# Patient Record
Sex: MALE | Race: BLACK OR AFRICAN AMERICAN | NOT HISPANIC OR LATINO | URBAN - METROPOLITAN AREA
[De-identification: names, ages, dates, MRNs, and addresses within clinical notes are randomized per-mention and may not be internally consistent; named-entity substitution may affect disease eponyms.]

---

## 2022-03-16 ENCOUNTER — INPATIENT (INPATIENT)
Facility: HOSPITAL | Age: 42
LOS: 8 days | Discharge: NURSING, REHAB OR LONG-TERM CARE | End: 2022-03-25
Attending: INTERNAL MEDICINE | Admitting: INTERNAL MEDICINE
Payer: MEDICAID

## 2022-03-16 VITALS
OXYGEN SATURATION: 100 % | HEART RATE: 107 BPM | RESPIRATION RATE: 18 BRPM | SYSTOLIC BLOOD PRESSURE: 70 MMHG | DIASTOLIC BLOOD PRESSURE: 25 MMHG

## 2022-03-16 LAB
ALBUMIN SERPL ELPH-MCNC: 4 G/DL — SIGNIFICANT CHANGE UP (ref 3.3–5)
ALP SERPL-CCNC: 150 U/L — HIGH (ref 40–120)
ALT FLD-CCNC: 142 U/L — HIGH (ref 12–78)
ANION GAP SERPL CALC-SCNC: 43 MMOL/L — HIGH (ref 5–17)
APTT BLD: 39.2 SEC — HIGH (ref 27.5–35.5)
AST SERPL-CCNC: 665 U/L — HIGH (ref 15–37)
BASE EXCESS BLDA CALC-SCNC: -22.9 MMOL/L — LOW (ref -2–3)
BASOPHILS # BLD AUTO: 0.02 K/UL — SIGNIFICANT CHANGE UP (ref 0–0.2)
BASOPHILS NFR BLD AUTO: 0.2 % — SIGNIFICANT CHANGE UP (ref 0–2)
BILIRUB SERPL-MCNC: 4.8 MG/DL — HIGH (ref 0.2–1.2)
BLOOD GAS COMMENTS: SIGNIFICANT CHANGE UP
BLOOD GAS COMMENTS: SIGNIFICANT CHANGE UP
BLOOD GAS SOURCE: SIGNIFICANT CHANGE UP
BUN SERPL-MCNC: 33 MG/DL — HIGH (ref 7–23)
CALCIUM SERPL-MCNC: 9.9 MG/DL — SIGNIFICANT CHANGE UP (ref 8.5–10.1)
CHLORIDE SERPL-SCNC: 87 MMOL/L — LOW (ref 96–108)
CK SERPL-CCNC: 146 U/L — SIGNIFICANT CHANGE UP (ref 26–308)
CO2 BLDA-SCNC: 7 MMOL/L — LOW (ref 19–24)
CO2 SERPL-SCNC: 8 MMOL/L — CRITICAL LOW (ref 22–31)
CREAT SERPL-MCNC: 3.69 MG/DL — HIGH (ref 0.5–1.3)
EGFR: 20 ML/MIN/1.73M2 — LOW
EOSINOPHIL # BLD AUTO: 0.01 K/UL — SIGNIFICANT CHANGE UP (ref 0–0.5)
EOSINOPHIL NFR BLD AUTO: 0.1 % — SIGNIFICANT CHANGE UP (ref 0–6)
FLUAV AG NPH QL: SIGNIFICANT CHANGE UP
FLUAV AG NPH QL: SIGNIFICANT CHANGE UP
FLUBV AG NPH QL: SIGNIFICANT CHANGE UP
FLUBV AG NPH QL: SIGNIFICANT CHANGE UP
GAS PNL BLDA: SIGNIFICANT CHANGE UP
GLUCOSE BLDC GLUCOMTR-MCNC: 163 MG/DL — HIGH (ref 70–99)
GLUCOSE BLDC GLUCOMTR-MCNC: 248 MG/DL — HIGH (ref 70–99)
GLUCOSE SERPL-MCNC: 215 MG/DL — HIGH (ref 70–99)
HCO3 BLDA-SCNC: 7 MMOL/L — CRITICAL LOW (ref 21–28)
HCT VFR BLD CALC: 43.9 % — SIGNIFICANT CHANGE UP (ref 39–50)
HGB BLD-MCNC: 14.1 G/DL — SIGNIFICANT CHANGE UP (ref 13–17)
HOROWITZ INDEX BLDA+IHG-RTO: 40 — SIGNIFICANT CHANGE UP
IMM GRANULOCYTES NFR BLD AUTO: 0.8 % — SIGNIFICANT CHANGE UP (ref 0–1.5)
INR BLD: 1.3 RATIO — HIGH (ref 0.88–1.16)
LACTATE SERPL-SCNC: 18.9 MMOL/L — CRITICAL HIGH (ref 0.7–2)
LYMPHOCYTES # BLD AUTO: 1.54 K/UL — SIGNIFICANT CHANGE UP (ref 1–3.3)
LYMPHOCYTES # BLD AUTO: 13.5 % — SIGNIFICANT CHANGE UP (ref 13–44)
MANUAL SMEAR VERIFICATION: YES — SIGNIFICANT CHANGE UP
MCHC RBC-ENTMCNC: 32.1 G/DL — SIGNIFICANT CHANGE UP (ref 32–36)
MCHC RBC-ENTMCNC: 36 PG — HIGH (ref 27–34)
MCV RBC AUTO: 112 FL — HIGH (ref 80–100)
MONOCYTES # BLD AUTO: 0.66 K/UL — SIGNIFICANT CHANGE UP (ref 0–0.9)
MONOCYTES NFR BLD AUTO: 5.8 % — SIGNIFICANT CHANGE UP (ref 2–14)
NEUTROPHILS # BLD AUTO: 9.08 K/UL — HIGH (ref 1.8–7.4)
NEUTROPHILS NFR BLD AUTO: 79.6 % — HIGH (ref 43–77)
NRBC # BLD: 1 /100 WBCS — HIGH (ref 0–0)
PCO2 BLDA: 25 MMHG — LOW (ref 32–46)
PH BLD: 7.03 — CRITICAL LOW (ref 7.35–7.45)
PLAT MORPH BLD: NORMAL — SIGNIFICANT CHANGE UP
PLATELET # BLD AUTO: 114 K/UL — LOW (ref 150–400)
PO2 BLDA: 208 MMHG — HIGH (ref 83–108)
POTASSIUM SERPL-MCNC: 3.4 MMOL/L — LOW (ref 3.5–5.3)
POTASSIUM SERPL-SCNC: 3.4 MMOL/L — LOW (ref 3.5–5.3)
PROT SERPL-MCNC: 8 GM/DL — SIGNIFICANT CHANGE UP (ref 6–8.3)
PROTHROM AB SERPL-ACNC: 15.5 SEC — HIGH (ref 10.5–13.4)
RBC # BLD: 3.92 M/UL — LOW (ref 4.2–5.8)
RBC # FLD: 14.1 % — SIGNIFICANT CHANGE UP (ref 10.3–14.5)
RBC BLD AUTO: SIGNIFICANT CHANGE UP
SAO2 % BLDA: 100 % — HIGH (ref 94–98)
SARS-COV-2 RNA SPEC QL NAA+PROBE: SIGNIFICANT CHANGE UP
SARS-COV-2 RNA SPEC QL NAA+PROBE: SIGNIFICANT CHANGE UP
SODIUM SERPL-SCNC: 138 MMOL/L — SIGNIFICANT CHANGE UP (ref 135–145)
TROPONIN I, HIGH SENSITIVITY RESULT: 28.3 NG/L — SIGNIFICANT CHANGE UP
TSH SERPL-MCNC: 6.94 UIU/ML — HIGH (ref 0.36–3.74)
WBC # BLD: 11.4 K/UL — HIGH (ref 3.8–10.5)
WBC # FLD AUTO: 11.4 K/UL — HIGH (ref 3.8–10.5)

## 2022-03-16 PROCEDURE — 99291 CRITICAL CARE FIRST HOUR: CPT

## 2022-03-16 PROCEDURE — 71045 X-RAY EXAM CHEST 1 VIEW: CPT | Mod: 26

## 2022-03-16 PROCEDURE — 70450 CT HEAD/BRAIN W/O DYE: CPT | Mod: 26,MA

## 2022-03-16 PROCEDURE — 93010 ELECTROCARDIOGRAM REPORT: CPT

## 2022-03-16 RX ORDER — FENTANYL CITRATE 50 UG/ML
3 INJECTION INTRAVENOUS
Qty: 2500 | Refills: 0 | Status: DISCONTINUED | OUTPATIENT
Start: 2022-03-16 | End: 2022-03-19

## 2022-03-16 RX ORDER — SODIUM BICARBONATE 1 MEQ/ML
150 SYRINGE (ML) INTRAVENOUS ONCE
Refills: 0 | Status: COMPLETED | OUTPATIENT
Start: 2022-03-16 | End: 2022-03-16

## 2022-03-16 RX ORDER — SODIUM CHLORIDE 9 MG/ML
1000 INJECTION, SOLUTION INTRAVENOUS
Refills: 0 | Status: DISCONTINUED | OUTPATIENT
Start: 2022-03-16 | End: 2022-03-17

## 2022-03-16 RX ORDER — ENOXAPARIN SODIUM 100 MG/ML
40 INJECTION SUBCUTANEOUS EVERY 24 HOURS
Refills: 0 | Status: DISCONTINUED | OUTPATIENT
Start: 2022-03-16 | End: 2022-03-16

## 2022-03-16 RX ORDER — SODIUM BICARBONATE 1 MEQ/ML
0.28 SYRINGE (ML) INTRAVENOUS
Qty: 150 | Refills: 0 | Status: DISCONTINUED | OUTPATIENT
Start: 2022-03-16 | End: 2022-03-16

## 2022-03-16 RX ORDER — VANCOMYCIN HCL 1 G
1000 VIAL (EA) INTRAVENOUS ONCE
Refills: 0 | Status: COMPLETED | OUTPATIENT
Start: 2022-03-16 | End: 2022-03-16

## 2022-03-16 RX ORDER — HEPARIN SODIUM 5000 [USP'U]/ML
5000 INJECTION INTRAVENOUS; SUBCUTANEOUS EVERY 12 HOURS
Refills: 0 | Status: DISCONTINUED | OUTPATIENT
Start: 2022-03-16 | End: 2022-03-18

## 2022-03-16 RX ORDER — SODIUM CHLORIDE 9 MG/ML
1000 INJECTION INTRAMUSCULAR; INTRAVENOUS; SUBCUTANEOUS ONCE
Refills: 0 | Status: COMPLETED | OUTPATIENT
Start: 2022-03-16 | End: 2022-03-16

## 2022-03-16 RX ORDER — PIPERACILLIN AND TAZOBACTAM 4; .5 G/20ML; G/20ML
3.38 INJECTION, POWDER, LYOPHILIZED, FOR SOLUTION INTRAVENOUS EVERY 8 HOURS
Refills: 0 | Status: COMPLETED | OUTPATIENT
Start: 2022-03-16 | End: 2022-03-22

## 2022-03-16 RX ORDER — CHLORHEXIDINE GLUCONATE 213 G/1000ML
1 SOLUTION TOPICAL
Refills: 0 | Status: DISCONTINUED | OUTPATIENT
Start: 2022-03-16 | End: 2022-03-17

## 2022-03-16 RX ORDER — PANTOPRAZOLE SODIUM 20 MG/1
40 TABLET, DELAYED RELEASE ORAL DAILY
Refills: 0 | Status: DISCONTINUED | OUTPATIENT
Start: 2022-03-16 | End: 2022-03-20

## 2022-03-16 RX ORDER — CHLORHEXIDINE GLUCONATE 213 G/1000ML
15 SOLUTION TOPICAL EVERY 12 HOURS
Refills: 0 | Status: DISCONTINUED | OUTPATIENT
Start: 2022-03-16 | End: 2022-03-19

## 2022-03-16 RX ORDER — SODIUM BICARBONATE 1 MEQ/ML
0.21 SYRINGE (ML) INTRAVENOUS
Qty: 150 | Refills: 0 | Status: DISCONTINUED | OUTPATIENT
Start: 2022-03-16 | End: 2022-03-16

## 2022-03-16 RX ORDER — NOREPINEPHRINE BITARTRATE/D5W 8 MG/250ML
0.05 PLASTIC BAG, INJECTION (ML) INTRAVENOUS
Qty: 8 | Refills: 0 | Status: DISCONTINUED | OUTPATIENT
Start: 2022-03-16 | End: 2022-03-19

## 2022-03-16 RX ADMIN — PIPERACILLIN AND TAZOBACTAM 200 GRAM(S): 4; .5 INJECTION, POWDER, LYOPHILIZED, FOR SOLUTION INTRAVENOUS at 22:53

## 2022-03-16 RX ADMIN — Medication 6.56 MICROGRAM(S)/KG/MIN: at 21:22

## 2022-03-16 RX ADMIN — Medication 150 MILLIEQUIVALENT(S): at 22:47

## 2022-03-16 RX ADMIN — FENTANYL CITRATE 21 MICROGRAM(S)/KG/HR: 50 INJECTION INTRAVENOUS at 21:22

## 2022-03-16 RX ADMIN — SODIUM CHLORIDE 1000 MILLILITER(S): 9 INJECTION INTRAMUSCULAR; INTRAVENOUS; SUBCUTANEOUS at 21:31

## 2022-03-16 NOTE — ED PROVIDER NOTE - PHYSICAL EXAMINATION
Constitutional: Unresponsive,  intubated.   ENMT: Airway patent.  Eyes: Pupils 5mm equal minimal reactive  Cardiac: Normal rate, regular rhythm.  Heart sounds S1, S2.  Respiratory: Breath sounds clear and equal bilaterally.  Gastrointestinal: Abdomen soft, non-tender, no guarding.  Neurological: Unresponsive, intubated.  Skin: No evidence of rash.

## 2022-03-16 NOTE — PATIENT PROFILE ADULT - NSPROMEDSADMININFO_GEN_A_NUR
Patient sedated and vented. Unable to get answer Patient sedated and vented. Unable to get answer/no concerns

## 2022-03-16 NOTE — H&P ADULT - HISTORY OF PRESENT ILLNESS
* History obtained from chart review and ED physician as pt is currently intubated and no family available at this time.     41 year old man with a PMHx of seizure (on keppra). Family called EMS after finding pt unresponsive in his bed. On arrival EMS reports pt was spontaneously breathing and had a pulse but was only responsive to pain. Finger stick was 23. Per EMS, pt went into witnessed arrest shortly there after,  ACLS immediately initiated, ROSC achieved after about 5-10 mins. No shocks given. On arrival to ED pt in sinus tach, sugars in 200s, hypotensive to 70s systolic and levophed drip was started. Patient started biting on the ETT and fentanyl drip was ordered.        * History obtained from chart review and ED physician as pt is currently intubated and no family available at this time.     41 year old man with a PMHx of seizure (on keppra). Family called EMS after finding pt unresponsive in his bed. On arrival EMS reports pt was spontaneously breathing and had a pulse but was only responsive to pain. Finger stick was 23. Per EMS, pt went into witnessed arrest shortly there after, ACLS immediately initiated, ROSC achieved after about 5-10 mins. No shocks given. On arrival to ED pt in sinus tachy, glucose in 200s, hypotensive to 70s systolic. Levophed drip was started. Patient started biting on the ETT and fentanyl drip was ordered. Labs showed K: 3.4, Cl: 87, gap: 43, BUN: 33, Creat: 3.6, T Bili: 4.8, AST/ALT elevated, L: 18.9, TSH: 6.9, pH: 7.0, pCo2: 25, HCO3: 7. CT Head done reported no acute intracranial hemorrhage or mass effect and no evidence of diffuse global anoxic injury on head CT.    Patient admitted to the ICU for management of acute hypoxic respiratory failure, post cardiac arrest, encephalopathy, JHONATAN, transaminitis, hypoglycemia, lactic acidosis, and severe metabolic acidosis.

## 2022-03-16 NOTE — ED ADULT TRIAGE NOTE - CHIEF COMPLAINT QUOTE
As per EMS As per EMS wife came home and found pt in bed supine position speech incomprehensible and responsive to pain. Finger stick checked and noted to be 23. States ALS crew was called and [facility]. EMS states 1 amp D50 and 1 epi given with ROSC. Repeat finger stick 176, ET tube 8.0.

## 2022-03-16 NOTE — PATIENT PROFILE ADULT - OVER THE PAST TWO WEEKS, HAVE YOU FELT LITTLE INTEREST OR PLEASURE IN DOING THINGS?
Patient sedated and vented. Unable to get answer Patient sedated and vented. Unable to get answer/no

## 2022-03-16 NOTE — ED ADULT NURSE NOTE - CHIEF COMPLAINT QUOTE
As per EMS wife came home and found pt in bed supine position speech incomprehensible and responsive to pain. Finger stick checked and noted to be 23. States ALS crew was called and while being transferred to [facility] pt went into cardiac arrest. EMS states 1 amp D50 and 1 epi given with ROSC. Repeat finger stick 176, ET tube 8.0.

## 2022-03-16 NOTE — H&P ADULT - NSHPPHYSICALEXAM_GEN_ALL_CORE
CONSTITUTIONAL: intubated and sedated   EYES: pupils sluggish to light reaction b/l 2 mm  ENMT: Oral mucosa with moist membranes. No external nasal lesions noted. ETT in place. OG tube in place   NECK: Supple, symmetric and without tracheal deviation  RESPIRATORY: CTA b/l, no wheezes, rales or rhonchi heard.   CARDIOVASCULAR: RRR, +S1, S2, no murmurs, no rubs, no gallops; no JVD; no peripheral edema  Vascular: pulses palpable   GASTROINTESTINAL: Soft, non tender, non distended, no rebound. Bowel sounds present   GENITOURINARY: Normal appearing external genitalia, no penile lesion. Alves in place.   MUSCULOSKELETAL: on physical stimuli, noted to move slightly the lower extremities.   SKIN: No rashes or ulcers noted.   NEUROLOGIC: RASS -3 ( on sedation ), corneals weak, positive gag and cough.   PSYCHIATRIC: unable to assess at this time. CONSTITUTIONAL: intubated and sedated   EYES: pupils sluggish to light reaction b/l 2 mm. Icteric sclera   ENMT: Oral mucosa with moist membranes. No external nasal lesions noted. ETT in place. OG tube in place   NECK: Supple, symmetric and without tracheal deviation  RESPIRATORY: CTA b/l, no wheezes, rales or rhonchi heard.   CARDIOVASCULAR: RRR, +S1, S2, no murmurs, no rubs, no gallops; no JVD; no peripheral edema  Vascular: pulses palpable   GASTROINTESTINAL: Soft, non tender, non distended, no rebound. Bowel sounds present   GENITOURINARY: Normal appearing external genitalia, no penile lesion. Alves in place.   MUSCULOSKELETAL: on physical stimuli, noted to move slightly the lower extremities.   SKIN: No rashes or ulcers noted.   NEUROLOGIC: RASS -3 ( on sedation ), corneals weak, positive gag and cough.   PSYCHIATRIC: unable to assess at this time.

## 2022-03-16 NOTE — PATIENT PROFILE ADULT - NSPROGENBLOODRESTRICT_GEN_A_NUR
Patient sedated and vented. Unable to get answer Patient sedated and vented. Unable to get answer/none

## 2022-03-16 NOTE — ED PROVIDER NOTE - OBJECTIVE STATEMENT
41M per EMS pmhx of seizure on keppra, family called 911 after finding pt unresponsive in his bed. On arrival EMS reports pt was spontaneously breathing and had a pulse but was only responsive to pain. Pt was promptly loaded into the ambulance where a finger stick showed 23. Pt went into witnessed arrest shortly there after, per EMS ACLS immediately initiated, 5-10 min of coding total, pt received 1 epi and an amp of d50 followed by ROSC.  No shocks given. Pt made stable blood pressure (130s/80s) following ROSC and was transported to the ED in sinus tach. On arrival to ED pt in sinus tach, sugars in 200s, hypotensive to 70s systolic, levophed started, etco2 ~30. ICU made aware.

## 2022-03-16 NOTE — PATIENT PROFILE ADULT - PATIENT'S GENDER IDENTITY
Patient sedated and vented. Unable to get answer Patient sedated and vented. Unable to get answer/Male

## 2022-03-16 NOTE — PATIENT PROFILE ADULT - FALL HARM RISK - UNIVERSAL INTERVENTIONS
Bed in lowest position, wheels locked, appropriate side rails in place/Call bell, personal items and telephone in reach/Instruct patient to call for assistance before getting out of bed or chair/Non-slip footwear when patient is out of bed/[place] to call system/Physically safe environment - no spills, clutter or unnecessary equipment/Purposeful Proactive Rounding/Room/bathroom lighting operational, light cord in reach

## 2022-03-16 NOTE — ED ADULT TRIAGE NOTE - NS ED TRIAGE AVPU SCALE
Unresponsive - The patient is nonverbal and does not respond even when a painful stimulus is applied. impaired balance/decreased flexibility/pain/decreased ROM/decreased strength

## 2022-03-16 NOTE — ED PROVIDER NOTE - CLINICAL SUMMARY MEDICAL DECISION MAKING FREE TEXT BOX
young male presenting post arrest intubated by EMS  requiring low dose levophed gtt for BP support  Pupils 5mm minimally reactive though pt biting on tube; fentanyl ordered  Will scan head, obtain labs/xray, q30 fingersticks, likely ICU admit.

## 2022-03-16 NOTE — ED ADULT NURSE NOTE - OBJECTIVE STATEMENT
As per EMS wife came home and found pt in bed supine position speech incomprehensible and responsive to pain. Finger stick checked and noted to be 23. States ALS crew was [facility]. EMS states 1 amp D50 and 1 epi given with ROSC in the field.     pt initiating own breath, intubated, being bagged by ems, respiratory paged to bedside/ pt hypotensive on arrival brought he bed 4 for resus. Repeat finger stick 176, ET tube 8.0.

## 2022-03-16 NOTE — H&P ADULT - NSHPLABSRESULTS_GEN_ALL_CORE
Lab Results:    CBC Full  -  ( 16 Mar 2022 21:21 )  WBC Count : 11.40 K/uL  RBC Count : 3.92 M/uL  Hemoglobin : 14.1 g/dL  Hematocrit : 43.9 %  Platelet Count - Automated : 114 K/uL  Mean Cell Volume : 112.0 fl  Mean Cell Hemoglobin : 36.0 pg  Mean Cell Hemoglobin Concentration : 32.1 g/dL  Auto Neutrophil # : 9.08 K/uL  Auto Lymphocyte # : 1.54 K/uL  Auto Monocyte # : 0.66 K/uL  Auto Eosinophil # : 0.01 K/uL  Auto Basophil # : 0.02 K/uL  Auto Neutrophil % : 79.6 %  Auto Lymphocyte % : 13.5 %  Auto Monocyte % : 5.8 %  Auto Eosinophil % : 0.1 %  Auto Basophil % : 0.2 %    Chemistry                        14.1   11.40 )-----------( 114      ( 16 Mar 2022 21:21 )             43.9     03-16    138  |  87<L>  |  33<H>  ----------------------------<  215<H>  3.4<L>   |  8<LL>  |  3.69<H>    Ca    9.9      16 Mar 2022 21:21    TPro  8.0  /  Alb  4.0  /  TBili  4.8<H>  /  DBili  x   /  AST  665<H>  /  ALT  142<H>  /  AlkPhos  150<H>  03-16    LIVER FUNCTIONS - ( 16 Mar 2022 21:21 )  Alb: 4.0 g/dL / Pro: 8.0 gm/dL / ALK PHOS: 150 U/L / ALT: 142 U/L / AST: 665 U/L / GGT: x           PT/INR - ( 16 Mar 2022 21:21 )   PT: 15.5 sec;   INR: 1.30 ratio    PTT - ( 16 Mar 2022 21:21 )  PTT:39.2 sec      ABG - ( 16 Mar 2022 21:35 )  pH, Arterial: x     pH, Blood: 7.03  /  pCO2: 25    /  pO2: 208   / HCO3: 7     / Base Excess: -22.9 /  SaO2: 100.0       Lactate, Blood: 18.9 mmol/L (03-16-22 @ 21:21)          RADIOLOGY RESULTS:    Head CT: No acute intracranial hemorrhage or mass effect. No evidence of diffuse global anoxic injury on head CT. Right temporal craniotomy with resection cavity and encephalomalacia/gliosis in the right anterior temporal lobe. Lab Results:    CBC Full  -  ( 16 Mar 2022 21:21 )  WBC Count : 11.40 K/uL  RBC Count : 3.92 M/uL  Hemoglobin : 14.1 g/dL  Hematocrit : 43.9 %  Platelet Count - Automated : 114 K/uL  Mean Cell Volume : 112.0 fl  Mean Cell Hemoglobin : 36.0 pg  Mean Cell Hemoglobin Concentration : 32.1 g/dL  Auto Neutrophil # : 9.08 K/uL  Auto Lymphocyte # : 1.54 K/uL  Auto Monocyte # : 0.66 K/uL  Auto Eosinophil # : 0.01 K/uL  Auto Basophil # : 0.02 K/uL  Auto Neutrophil % : 79.6 %  Auto Lymphocyte % : 13.5 %  Auto Monocyte % : 5.8 %  Auto Eosinophil % : 0.1 %  Auto Basophil % : 0.2 %    Chemistry                        14.1   11.40 )-----------( 114      ( 16 Mar 2022 21:21 )             43.9     03-16    138  |  87<L>  |  33<H>  ----------------------------<  215<H>  3.4<L>   |  8<LL>  |  3.69<H>    Ca    9.9      16 Mar 2022 21:21    TPro  8.0  /  Alb  4.0  /  TBili  4.8<H>  /  DBili  x   /  AST  665<H>  /  ALT  142<H>  /  AlkPhos  150<H>  03-16    LIVER FUNCTIONS - ( 16 Mar 2022 21:21 )  Alb: 4.0 g/dL / Pro: 8.0 gm/dL / ALK PHOS: 150 U/L / ALT: 142 U/L / AST: 665 U/L / GGT: x           PT/INR - ( 16 Mar 2022 21:21 )   PT: 15.5 sec;   INR: 1.30 ratio    PTT - ( 16 Mar 2022 21:21 )  PTT:39.2 sec    ABG - ( 16 Mar 2022 21:35 )  pH, Arterial: x     pH, Blood: 7.03  /  pCO2: 25    /  pO2: 208   / HCO3: 7     / Base Excess: -22.9 /  SaO2: 100.0     Lactate, Blood: 18.9 mmol/L (03-16-22 @ 21:21)        RADIOLOGY RESULTS:    Head CT: No acute intracranial hemorrhage or mass effect. No evidence of diffuse global anoxic injury on head CT. Right temporal craniotomy with resection cavity and encephalomalacia/gliosis in the right anterior temporal lobe.

## 2022-03-16 NOTE — PATIENT PROFILE ADULT - PATIENT'S SEXUAL ORIENTATION
Patient sedated and vented. Unable to get answer Patient sedated and vented. Unable to get answer/Heterosexual

## 2022-03-16 NOTE — PATIENT PROFILE ADULT - FUNCTIONAL SCREEN CURRENT LEVEL: COMMUNICATION, MLM
3 = unable to understand or speak (not related to language barrier) Patient sedated and vented. Unable to get answer/3 = unable to understand or speak (not related to language barrier)

## 2022-03-16 NOTE — H&P ADULT - ATTENDING COMMENTS
40 y/o M w/history of seizure disorder, ETOH abuse, and prior craniotomy? presenting following cardiac arrest. Unclear etiology of cardiac arrest. Acute respiratory failure with hypoxia and hypercapnia in setting of arrest. Hypotension. JHONATAN likely ATN. Elevaeted liver enzymes possibly alcoholic hepatitis vs shock liver. Severe lactic acidosis - possibly due to arrest/ischemia vs seizure.    - TTM goal 36 degrees  - Sedation as needed  - Continue mechanical ventilation  - Titrate pressors as needed goal MAP >= 65  - Broad spectrum abx  - Trend Cr, avoid nephrotoxins  - Trend LFTs, avoid hepatotoxins  - TTE  - DVT prophylaxis  - Full code  - Guarded prognosis

## 2022-03-16 NOTE — PATIENT PROFILE ADULT - VISION (WITH CORRECTIVE LENSES IF THE PATIENT USUALLY WEARS THEM):
Patient sedated and vented. Unable to get answer Patient sedated and vented. Unable to get answer/Normal vision: sees adequately in most situations; can see medication labels, newsprint

## 2022-03-16 NOTE — H&P ADULT - ASSESSMENT
Plan:     Neuro:  CT head negative for anything acute.   Neuro checks q 4 hour   On fentanyl drip for sedation.   Titrate sedation to RASS of 0 to -2  History of seizures per notes. ? if pt had a seizure at home.   Keppra level ordered.   Neuro consult in am.       CV:   Continue hemodynamic monitoring   Currently on levophed drip.   Target MAP 65-70.   Troponin neg x 1  Echo ordered.   EKG ordered.   Trend lactate level    Pulmonary:   Continue with mechanical ventilation.  Settings in the ED: 16/450/40%/+5  Settings adjusted.   CXR ordered  Abg ordered   No weaning at this time.     GI:   GI prophylaxis with pantoprazole   NPO  insert ngt    Endo:   Monitor fingersticks q 4 hrs   Hgb A1c and TSH ordered      Renal:   Alves to be placed in a critical ill pt for accurate urinary monitoring   I and O hourly   Renally dose medications  Monitor and correct electrolytes  Sodium bicarb drip started for metabolic acidosis      ID:   Blood, urine and sputum cultures sent   UA ordered.   Started emperically on zosyn and vanco by level.   Trend lactate level.   Procal ordered.   Covid Negative.     Heme:   Repeat labs   DVT prophylaxis with SCD and lovenox       Other/Disposition:  Patient will be admitted to the ICU.   Code Status: Full code  Will try to reach family for more information and to update them    Case discussed with ICU attending.      * History obtained from chart review and ED physician as pt is currently intubated and no family available at this time.     41 year old man with a PMHx of seizure (on keppra). Family called EMS after finding pt unresponsive in his bed. On arrival EMS reports pt was spontaneously breathing and had a pulse but was only responsive to pain. Finger stick was 23. Per EMS, pt went into witnessed arrest shortly there after, ACLS immediately initiated, ROSC achieved after about 5-10 mins. No shocks given. On arrival to ED pt in sinus tachy, glucose in 200s, hypotensive to 70s systolic. Levophed drip was started. Patient started biting on the ETT and fentanyl drip was ordered. Labs showed K: 3.4, Cl: 87, gap: 43, BUN: 33, Creat: 3.6, T Bili: 4.8, AST/ALT elevated, L: 18.9, TSH: 6.9, pH: 7.0, pCo2: 25, HCO3: 7. CT Head done reported no acute intracranial hemorrhage or mass effect and no evidence of diffuse global anoxic injury on head CT.    Patient admitted to the ICU for management of acute hypoxic respiratory failure, post cardiac arrest, encephalopathy, JHONATAN, transaminitis, hypoglycemia, lactic acidosis, and severe metabolic acidosis.         Plan:     Neuro:  CT head negative for anything acute.   Neuro checks q 4 hour   On fentanyl drip for sedation.   Titrate sedation to RASS of 0 to -2  History of seizures per notes. ? if pt had a seizure at home.   Keppra level ordered.   Neuro consult in am.   Targeted temperature management   Avoid fever.    CV:   Continue hemodynamic monitoring   Currently on levophed drip.   Target MAP of 65-70.   Troponin neg x 1  Echo ordered.   EKG ordered.   Trend lactate level    Pulmonary:   Continue with mechanical ventilation.  Settings in the ED: 16/450/40%/+5  Settings adjusted.   CXR ordered  Abg ordered   No weaning at this time.     GI:   GI prophylaxis with pantoprazole   NPO  insert ngt    Endo:   Monitor fingersticks q 4 hrs   Hgb A1c and TSH ordered    Renal:   Alves to be placed in a critical ill pt for accurate urinary monitoring   I and O hourly   Renally dose medications  Monitor and correct electrolytes  Sodium bicarb ordered.     ID:   Blood, urine and sputum cultures sent   UA ordered.   Started empirically on zosyn and vanco by level.   Trend lactate level.   Procal ordered.   Covid Negative.     Heme:   Repeat labs   DVT prophylaxis with SCD and lovenox       Other/Disposition:  Patient will be admitted to the ICU.   Code Status: Full code  Will try to reach family for more information and to update them    Case discussed with ICU attending.      * History obtained from chart review and ED physician as pt is currently intubated and no family available at this time.     41 year old man with a PMHx of seizure (on keppra). Family called EMS after finding pt unresponsive in his bed. On arrival EMS reports pt was spontaneously breathing and had a pulse but was only responsive to pain. Finger stick was 23. Per EMS, pt went into witnessed arrest shortly there after, ACLS immediately initiated, ROSC achieved after about 5-10 mins. No shocks given. On arrival to ED pt in sinus tachy, glucose in 200s, hypotensive to 70s systolic. Levophed drip was started. Patient started biting on the ETT and fentanyl drip was ordered. Labs showed K: 3.4, Cl: 87, gap: 43, BUN: 33, Creat: 3.6, T Bili: 4.8, AST/ALT elevated, L: 18.9, TSH: 6.9, pH: 7.0, pCo2: 25, HCO3: 7. CT Head done reported no acute intracranial hemorrhage or mass effect and no evidence of diffuse global anoxic injury on head CT.    Patient admitted to the ICU for management of acute hypoxic respiratory failure, post cardiac arrest, encephalopathy, JHONATAN, transaminitis, hypoglycemia, lactic acidosis, and severe metabolic acidosis.         Plan:     Neuro:  CT head negative for anything acute.   Neuro checks q 4 hour   On fentanyl drip for sedation.   Titrate sedation to RASS of 0 to -2  History of seizures per notes. ? if pt had a seizure at home.   Keppra level ordered.   Neuro consult in am.   Targeted temperature management   Avoid fever.    CV:   Continue hemodynamic monitoring   Currently on levophed drip.   Target MAP of 65-70.   Troponin neg x 1  Echo ordered.   EKG ordered.   Trend lactate level    Pulmonary:   Continue with mechanical ventilation.  Settings in the ED: 16/450/40%/+5  Settings adjusted.   CXR ordered  Abg ordered   No weaning at this time.     GI:   GI prophylaxis with pantoprazole   NPO  insert ngt  US abdomen ordered     Endo:   Monitor fingersticks q 4 hrs   Hgb A1c and TSH ordered    Renal:   Alves to be placed in a critical ill pt for accurate urinary monitoring   I and O hourly   Renally dose medications  Monitor and correct electrolytes  Sodium bicarb ordered.     ID:   Blood, urine and sputum cultures sent   UA ordered.   Started empirically on zosyn and vanco by level.   Trend lactate level.   Procal ordered.   Covid Negative.     Heme:   Repeat labs   DVT prophylaxis with SCD and lovenox       Other/Disposition:  Patient will be admitted to the ICU.   Code Status: Full code  Will try to reach family for more information and to update them    Case discussed with ICU attending.

## 2022-03-16 NOTE — CHART NOTE - NSCHARTNOTEFT_GEN_A_CORE
Bedside POCUS done. Bilateral A-line predominant pattern on lung imaging. Cardiac echo showed decreased LV contractility with normal RV contractility and normal chamber sizes.

## 2022-03-17 LAB
ALBUMIN SERPL ELPH-MCNC: 2.9 G/DL — LOW (ref 3.3–5)
ALBUMIN SERPL ELPH-MCNC: 3.4 G/DL — SIGNIFICANT CHANGE UP (ref 3.3–5)
ALP SERPL-CCNC: 124 U/L — HIGH (ref 40–120)
ALP SERPL-CCNC: 92 U/L — SIGNIFICANT CHANGE UP (ref 40–120)
ALT FLD-CCNC: 141 U/L — HIGH (ref 12–78)
ALT FLD-CCNC: 156 U/L — HIGH (ref 12–78)
AMMONIA BLD-MCNC: 91 UMOL/L — HIGH (ref 11–32)
AMPHET UR-MCNC: NEGATIVE — SIGNIFICANT CHANGE UP
ANION GAP SERPL CALC-SCNC: 22 MMOL/L — HIGH (ref 5–17)
ANION GAP SERPL CALC-SCNC: 28 MMOL/L — HIGH (ref 5–17)
ANION GAP SERPL CALC-SCNC: 36 MMOL/L — HIGH (ref 5–17)
APAP SERPL-MCNC: < 2 UG/ML (ref 10–30)
APPEARANCE UR: ABNORMAL
APTT BLD: 35.7 SEC — HIGH (ref 27.5–35.5)
AST SERPL-CCNC: 1025 U/L — HIGH (ref 15–37)
AST SERPL-CCNC: 968 U/L — HIGH (ref 15–37)
BACTERIA # UR AUTO: ABNORMAL
BARBITURATES UR SCN-MCNC: NEGATIVE — SIGNIFICANT CHANGE UP
BASOPHILS # BLD AUTO: 0.01 K/UL — SIGNIFICANT CHANGE UP (ref 0–0.2)
BASOPHILS NFR BLD AUTO: 0.2 % — SIGNIFICANT CHANGE UP (ref 0–2)
BENZODIAZ UR-MCNC: NEGATIVE — SIGNIFICANT CHANGE UP
BILIRUB DIRECT SERPL-MCNC: 3.3 MG/DL — HIGH (ref 0–0.3)
BILIRUB INDIRECT FLD-MCNC: 1 MG/DL — SIGNIFICANT CHANGE UP (ref 0.2–1)
BILIRUB SERPL-MCNC: 4.3 MG/DL — HIGH (ref 0.2–1.2)
BILIRUB SERPL-MCNC: 5.9 MG/DL — HIGH (ref 0.2–1.2)
BILIRUB UR-MCNC: ABNORMAL
BUN SERPL-MCNC: 33 MG/DL — HIGH (ref 7–23)
BUN SERPL-MCNC: 34 MG/DL — HIGH (ref 7–23)
BUN SERPL-MCNC: 35 MG/DL — HIGH (ref 7–23)
CALCIUM SERPL-MCNC: 8.1 MG/DL — LOW (ref 8.5–10.1)
CALCIUM SERPL-MCNC: 8.3 MG/DL — LOW (ref 8.5–10.1)
CALCIUM SERPL-MCNC: 8.7 MG/DL — SIGNIFICANT CHANGE UP (ref 8.5–10.1)
CHLORIDE SERPL-SCNC: 100 MMOL/L — SIGNIFICANT CHANGE UP (ref 96–108)
CHLORIDE SERPL-SCNC: 95 MMOL/L — LOW (ref 96–108)
CHLORIDE SERPL-SCNC: 98 MMOL/L — SIGNIFICANT CHANGE UP (ref 96–108)
CO2 SERPL-SCNC: 12 MMOL/L — LOW (ref 22–31)
CO2 SERPL-SCNC: 16 MMOL/L — LOW (ref 22–31)
CO2 SERPL-SCNC: 20 MMOL/L — LOW (ref 22–31)
COCAINE METAB.OTHER UR-MCNC: NEGATIVE — SIGNIFICANT CHANGE UP
COLOR SPEC: YELLOW — SIGNIFICANT CHANGE UP
CREAT SERPL-MCNC: 2.65 MG/DL — HIGH (ref 0.5–1.3)
CREAT SERPL-MCNC: 2.68 MG/DL — HIGH (ref 0.5–1.3)
CREAT SERPL-MCNC: 2.95 MG/DL — HIGH (ref 0.5–1.3)
DIFF PNL FLD: ABNORMAL
EGFR: 26 ML/MIN/1.73M2 — LOW
EGFR: 30 ML/MIN/1.73M2 — LOW
EGFR: 30 ML/MIN/1.73M2 — LOW
EOSINOPHIL # BLD AUTO: 0 K/UL — SIGNIFICANT CHANGE UP (ref 0–0.5)
EOSINOPHIL NFR BLD AUTO: 0 % — SIGNIFICANT CHANGE UP (ref 0–6)
EPI CELLS # UR: SIGNIFICANT CHANGE UP
ETHANOL SERPL-MCNC: <10 MG/DL — SIGNIFICANT CHANGE UP (ref 0–10)
FERRITIN SERPL-MCNC: HIGH NG/ML (ref 30–400)
GAS PNL BLDA: SIGNIFICANT CHANGE UP
GLUCOSE BLDC GLUCOMTR-MCNC: 105 MG/DL — HIGH (ref 70–99)
GLUCOSE BLDC GLUCOMTR-MCNC: 131 MG/DL — HIGH (ref 70–99)
GLUCOSE BLDC GLUCOMTR-MCNC: 155 MG/DL — HIGH (ref 70–99)
GLUCOSE BLDC GLUCOMTR-MCNC: 169 MG/DL — HIGH (ref 70–99)
GLUCOSE BLDC GLUCOMTR-MCNC: 80 MG/DL — SIGNIFICANT CHANGE UP (ref 70–99)
GLUCOSE BLDC GLUCOMTR-MCNC: 98 MG/DL — SIGNIFICANT CHANGE UP (ref 70–99)
GLUCOSE SERPL-MCNC: 127 MG/DL — HIGH (ref 70–99)
GLUCOSE SERPL-MCNC: 152 MG/DL — HIGH (ref 70–99)
GLUCOSE SERPL-MCNC: 154 MG/DL — HIGH (ref 70–99)
GLUCOSE UR QL: NEGATIVE MG/DL — SIGNIFICANT CHANGE UP
GRAM STN FLD: SIGNIFICANT CHANGE UP
HAV IGM SER-ACNC: SIGNIFICANT CHANGE UP
HBV CORE IGM SER-ACNC: SIGNIFICANT CHANGE UP
HBV SURFACE AG SER-ACNC: SIGNIFICANT CHANGE UP
HCT VFR BLD CALC: 31.1 % — LOW (ref 39–50)
HCT VFR BLD CALC: 37.4 % — LOW (ref 39–50)
HCV AB S/CO SERPL IA: 0.21 S/CO — SIGNIFICANT CHANGE UP (ref 0–0.99)
HCV AB SERPL-IMP: SIGNIFICANT CHANGE UP
HGB BLD-MCNC: 10.7 G/DL — LOW (ref 13–17)
HGB BLD-MCNC: 12 G/DL — LOW (ref 13–17)
IMM GRANULOCYTES NFR BLD AUTO: 0.9 % — SIGNIFICANT CHANGE UP (ref 0–1.5)
INR BLD: 1.4 RATIO — HIGH (ref 0.88–1.16)
IRON SATN MFR SERPL: 139 UG/DL — SIGNIFICANT CHANGE UP (ref 45–165)
KETONES UR-MCNC: ABNORMAL
LACTATE SERPL-SCNC: 11 MMOL/L — CRITICAL HIGH (ref 0.7–2)
LACTATE SERPL-SCNC: 12.4 MMOL/L — CRITICAL HIGH (ref 0.7–2)
LACTATE SERPL-SCNC: 16.6 MMOL/L — CRITICAL HIGH (ref 0.7–2)
LACTATE SERPL-SCNC: 17.5 MMOL/L — CRITICAL HIGH (ref 0.7–2)
LEGIONELLA AG UR QL: NEGATIVE — SIGNIFICANT CHANGE UP
LEUKOCYTE ESTERASE UR-ACNC: ABNORMAL
LYMPHOCYTES # BLD AUTO: 0.24 K/UL — LOW (ref 1–3.3)
LYMPHOCYTES # BLD AUTO: 5.5 % — LOW (ref 13–44)
MAGNESIUM SERPL-MCNC: 1.5 MG/DL — LOW (ref 1.6–2.6)
MAGNESIUM SERPL-MCNC: 1.9 MG/DL — SIGNIFICANT CHANGE UP (ref 1.6–2.6)
MAGNESIUM SERPL-MCNC: 2 MG/DL — SIGNIFICANT CHANGE UP (ref 1.6–2.6)
MCHC RBC-ENTMCNC: 32.1 G/DL — SIGNIFICANT CHANGE UP (ref 32–36)
MCHC RBC-ENTMCNC: 34.4 G/DL — SIGNIFICANT CHANGE UP (ref 32–36)
MCHC RBC-ENTMCNC: 35.8 PG — HIGH (ref 27–34)
MCHC RBC-ENTMCNC: 36 PG — HIGH (ref 27–34)
MCV RBC AUTO: 104.7 FL — HIGH (ref 80–100)
MCV RBC AUTO: 111.6 FL — HIGH (ref 80–100)
METHADONE UR-MCNC: NEGATIVE — SIGNIFICANT CHANGE UP
MONOCYTES # BLD AUTO: 0.13 K/UL — SIGNIFICANT CHANGE UP (ref 0–0.9)
MONOCYTES NFR BLD AUTO: 3 % — SIGNIFICANT CHANGE UP (ref 2–14)
NEUTROPHILS # BLD AUTO: 3.91 K/UL — SIGNIFICANT CHANGE UP (ref 1.8–7.4)
NEUTROPHILS NFR BLD AUTO: 90.4 % — HIGH (ref 43–77)
NITRITE UR-MCNC: POSITIVE
NRBC # BLD: 2 /100 WBCS — HIGH (ref 0–0)
NRBC # BLD: 3 /100 WBCS — HIGH (ref 0–0)
OPIATES UR-MCNC: NEGATIVE — SIGNIFICANT CHANGE UP
PCP SPEC-MCNC: SIGNIFICANT CHANGE UP
PCP UR-MCNC: NEGATIVE — SIGNIFICANT CHANGE UP
PH UR: 5 — SIGNIFICANT CHANGE UP (ref 5–8)
PHOSPHATE SERPL-MCNC: 0.9 MG/DL — CRITICAL LOW (ref 2.5–4.5)
PHOSPHATE SERPL-MCNC: 1.5 MG/DL — LOW (ref 2.5–4.5)
PHOSPHATE SERPL-MCNC: 7.1 MG/DL — HIGH (ref 2.5–4.5)
PLATELET # BLD AUTO: 78 K/UL — LOW (ref 150–400)
PLATELET # BLD AUTO: 88 K/UL — LOW (ref 150–400)
POTASSIUM SERPL-MCNC: 2.6 MMOL/L — CRITICAL LOW (ref 3.5–5.3)
POTASSIUM SERPL-MCNC: 3.1 MMOL/L — LOW (ref 3.5–5.3)
POTASSIUM SERPL-MCNC: 3.5 MMOL/L — SIGNIFICANT CHANGE UP (ref 3.5–5.3)
POTASSIUM SERPL-SCNC: 2.6 MMOL/L — CRITICAL LOW (ref 3.5–5.3)
POTASSIUM SERPL-SCNC: 3.1 MMOL/L — LOW (ref 3.5–5.3)
POTASSIUM SERPL-SCNC: 3.5 MMOL/L — SIGNIFICANT CHANGE UP (ref 3.5–5.3)
PROCALCITONIN SERPL-MCNC: 7.09 NG/ML — HIGH (ref 0.02–0.1)
PROT SERPL-MCNC: 5.4 GM/DL — LOW (ref 6–8.3)
PROT SERPL-MCNC: 6.4 GM/DL — SIGNIFICANT CHANGE UP (ref 6–8.3)
PROT UR-MCNC: 100 MG/DL
PROTHROM AB SERPL-ACNC: 16.7 SEC — HIGH (ref 10.5–13.4)
RBC # BLD: 2.97 M/UL — LOW (ref 4.2–5.8)
RBC # BLD: 3.35 M/UL — LOW (ref 4.2–5.8)
RBC # FLD: 13.7 % — SIGNIFICANT CHANGE UP (ref 10.3–14.5)
RBC # FLD: 14.2 % — SIGNIFICANT CHANGE UP (ref 10.3–14.5)
RBC CASTS # UR COMP ASSIST: ABNORMAL /HPF (ref 0–4)
SODIUM SERPL-SCNC: 142 MMOL/L — SIGNIFICANT CHANGE UP (ref 135–145)
SODIUM SERPL-SCNC: 142 MMOL/L — SIGNIFICANT CHANGE UP (ref 135–145)
SODIUM SERPL-SCNC: 143 MMOL/L — SIGNIFICANT CHANGE UP (ref 135–145)
SP GR SPEC: 1.02 — SIGNIFICANT CHANGE UP (ref 1.01–1.02)
SPECIMEN SOURCE: SIGNIFICANT CHANGE UP
T3FREE SERPL-MCNC: 1.7 PG/ML — LOW (ref 1.8–4.6)
T4 AB SER-ACNC: 5.5 UG/DL — SIGNIFICANT CHANGE UP (ref 4.6–12)
THC UR QL: NEGATIVE — SIGNIFICANT CHANGE UP
TROPONIN I, HIGH SENSITIVITY RESULT: 94.8 NG/L — HIGH
UROBILINOGEN FLD QL: 8 MG/DL
WBC # BLD: 4.33 K/UL — SIGNIFICANT CHANGE UP (ref 3.8–10.5)
WBC # BLD: 6 K/UL — SIGNIFICANT CHANGE UP (ref 3.8–10.5)
WBC # FLD AUTO: 4.33 K/UL — SIGNIFICANT CHANGE UP (ref 3.8–10.5)
WBC # FLD AUTO: 6 K/UL — SIGNIFICANT CHANGE UP (ref 3.8–10.5)
WBC UR QL: SIGNIFICANT CHANGE UP

## 2022-03-17 PROCEDURE — 93306 TTE W/DOPPLER COMPLETE: CPT | Mod: 26

## 2022-03-17 PROCEDURE — 99291 CRITICAL CARE FIRST HOUR: CPT

## 2022-03-17 PROCEDURE — 76700 US EXAM ABDOM COMPLETE: CPT | Mod: 26

## 2022-03-17 PROCEDURE — 93010 ELECTROCARDIOGRAM REPORT: CPT

## 2022-03-17 RX ORDER — THIAMINE MONONITRATE (VIT B1) 100 MG
100 TABLET ORAL DAILY
Refills: 0 | Status: DISCONTINUED | OUTPATIENT
Start: 2022-03-17 | End: 2022-03-20

## 2022-03-17 RX ORDER — SODIUM CHLORIDE 9 MG/ML
500 INJECTION, SOLUTION INTRAVENOUS ONCE
Refills: 0 | Status: DISCONTINUED | OUTPATIENT
Start: 2022-03-17 | End: 2022-03-17

## 2022-03-17 RX ORDER — SODIUM CHLORIDE 9 MG/ML
1000 INJECTION, SOLUTION INTRAVENOUS ONCE
Refills: 0 | Status: COMPLETED | OUTPATIENT
Start: 2022-03-17 | End: 2022-03-17

## 2022-03-17 RX ORDER — POTASSIUM PHOSPHATE, MONOBASIC POTASSIUM PHOSPHATE, DIBASIC 236; 224 MG/ML; MG/ML
30 INJECTION, SOLUTION INTRAVENOUS ONCE
Refills: 0 | Status: COMPLETED | OUTPATIENT
Start: 2022-03-17 | End: 2022-03-17

## 2022-03-17 RX ORDER — POTASSIUM CHLORIDE 20 MEQ
10 PACKET (EA) ORAL
Refills: 0 | Status: COMPLETED | OUTPATIENT
Start: 2022-03-17 | End: 2022-03-17

## 2022-03-17 RX ORDER — ASCORBIC ACID 60 MG
500 TABLET,CHEWABLE ORAL DAILY
Refills: 0 | Status: DISCONTINUED | OUTPATIENT
Start: 2022-03-17 | End: 2022-03-25

## 2022-03-17 RX ORDER — DEXMEDETOMIDINE HYDROCHLORIDE IN 0.9% SODIUM CHLORIDE 4 UG/ML
0.7 INJECTION INTRAVENOUS
Qty: 200 | Refills: 0 | Status: DISCONTINUED | OUTPATIENT
Start: 2022-03-17 | End: 2022-03-19

## 2022-03-17 RX ORDER — MAGNESIUM SULFATE 500 MG/ML
2 VIAL (ML) INJECTION ONCE
Refills: 0 | Status: COMPLETED | OUTPATIENT
Start: 2022-03-17 | End: 2022-03-17

## 2022-03-17 RX ORDER — FOLIC ACID 0.8 MG
1 TABLET ORAL DAILY
Refills: 0 | Status: DISCONTINUED | OUTPATIENT
Start: 2022-03-17 | End: 2022-03-20

## 2022-03-17 RX ORDER — LACTULOSE 10 G/15ML
10 SOLUTION ORAL EVERY 12 HOURS
Refills: 0 | Status: DISCONTINUED | OUTPATIENT
Start: 2022-03-17 | End: 2022-03-21

## 2022-03-17 RX ORDER — CHLORHEXIDINE GLUCONATE 213 G/1000ML
1 SOLUTION TOPICAL DAILY
Refills: 0 | Status: DISCONTINUED | OUTPATIENT
Start: 2022-03-17 | End: 2022-03-22

## 2022-03-17 RX ORDER — LEVETIRACETAM 250 MG/1
750 TABLET, FILM COATED ORAL EVERY 12 HOURS
Refills: 0 | Status: DISCONTINUED | OUTPATIENT
Start: 2022-03-17 | End: 2022-03-21

## 2022-03-17 RX ORDER — POTASSIUM CHLORIDE 20 MEQ
40 PACKET (EA) ORAL ONCE
Refills: 0 | Status: COMPLETED | OUTPATIENT
Start: 2022-03-17 | End: 2022-03-17

## 2022-03-17 RX ORDER — LEVOTHYROXINE SODIUM 125 MCG
25 TABLET ORAL DAILY
Refills: 0 | Status: DISCONTINUED | OUTPATIENT
Start: 2022-03-17 | End: 2022-03-25

## 2022-03-17 RX ORDER — ALBUMIN HUMAN 25 %
250 VIAL (ML) INTRAVENOUS EVERY 6 HOURS
Refills: 0 | Status: DISCONTINUED | OUTPATIENT
Start: 2022-03-17 | End: 2022-03-18

## 2022-03-17 RX ORDER — SODIUM CHLORIDE 9 MG/ML
1000 INJECTION, SOLUTION INTRAVENOUS
Refills: 0 | Status: DISCONTINUED | OUTPATIENT
Start: 2022-03-17 | End: 2022-03-19

## 2022-03-17 RX ADMIN — Medication 25 MICROGRAM(S): at 22:12

## 2022-03-17 RX ADMIN — DEXMEDETOMIDINE HYDROCHLORIDE IN 0.9% SODIUM CHLORIDE 9.56 MICROGRAM(S)/KG/HR: 4 INJECTION INTRAVENOUS at 17:26

## 2022-03-17 RX ADMIN — SODIUM CHLORIDE 1000 MILLILITER(S): 9 INJECTION INTRAMUSCULAR; INTRAVENOUS; SUBCUTANEOUS at 00:30

## 2022-03-17 RX ADMIN — Medication 100 MILLIEQUIVALENT(S): at 08:10

## 2022-03-17 RX ADMIN — CHLORHEXIDINE GLUCONATE 1 APPLICATION(S): 213 SOLUTION TOPICAL at 13:55

## 2022-03-17 RX ADMIN — POTASSIUM PHOSPHATE, MONOBASIC POTASSIUM PHOSPHATE, DIBASIC 83.33 MILLIMOLE(S): 236; 224 INJECTION, SOLUTION INTRAVENOUS at 07:46

## 2022-03-17 RX ADMIN — SODIUM CHLORIDE 100 MILLILITER(S): 9 INJECTION, SOLUTION INTRAVENOUS at 05:47

## 2022-03-17 RX ADMIN — LEVETIRACETAM 400 MILLIGRAM(S): 250 TABLET, FILM COATED ORAL at 22:09

## 2022-03-17 RX ADMIN — FENTANYL CITRATE 21 MICROGRAM(S)/KG/HR: 50 INJECTION INTRAVENOUS at 23:46

## 2022-03-17 RX ADMIN — Medication 40 MILLIEQUIVALENT(S): at 22:08

## 2022-03-17 RX ADMIN — Medication 100 MILLIEQUIVALENT(S): at 09:20

## 2022-03-17 RX ADMIN — HEPARIN SODIUM 5000 UNIT(S): 5000 INJECTION INTRAVENOUS; SUBCUTANEOUS at 17:25

## 2022-03-17 RX ADMIN — Medication 125 MILLILITER(S): at 18:16

## 2022-03-17 RX ADMIN — Medication 6.56 MICROGRAM(S)/KG/MIN: at 23:49

## 2022-03-17 RX ADMIN — SODIUM CHLORIDE 1000 MILLILITER(S): 9 INJECTION, SOLUTION INTRAVENOUS at 00:00

## 2022-03-17 RX ADMIN — Medication 1 MILLIGRAM(S): at 22:08

## 2022-03-17 RX ADMIN — HEPARIN SODIUM 5000 UNIT(S): 5000 INJECTION INTRAVENOUS; SUBCUTANEOUS at 05:47

## 2022-03-17 RX ADMIN — DEXMEDETOMIDINE HYDROCHLORIDE IN 0.9% SODIUM CHLORIDE 9.56 MICROGRAM(S)/KG/HR: 4 INJECTION INTRAVENOUS at 23:46

## 2022-03-17 RX ADMIN — Medication 500 MILLIGRAM(S): at 22:19

## 2022-03-17 RX ADMIN — LACTULOSE 10 GRAM(S): 10 SOLUTION ORAL at 22:11

## 2022-03-17 RX ADMIN — PANTOPRAZOLE SODIUM 40 MILLIGRAM(S): 20 TABLET, DELAYED RELEASE ORAL at 11:42

## 2022-03-17 RX ADMIN — Medication 100 MILLIEQUIVALENT(S): at 06:58

## 2022-03-17 RX ADMIN — Medication 25 GRAM(S): at 06:51

## 2022-03-17 RX ADMIN — Medication 1 APPLICATION(S): at 22:12

## 2022-03-17 RX ADMIN — Medication 125 MILLILITER(S): at 11:43

## 2022-03-17 RX ADMIN — LEVETIRACETAM 400 MILLIGRAM(S): 250 TABLET, FILM COATED ORAL at 09:52

## 2022-03-17 RX ADMIN — DEXMEDETOMIDINE HYDROCHLORIDE IN 0.9% SODIUM CHLORIDE 9.56 MICROGRAM(S)/KG/HR: 4 INJECTION INTRAVENOUS at 14:37

## 2022-03-17 RX ADMIN — Medication 6.56 MICROGRAM(S)/KG/MIN: at 05:46

## 2022-03-17 RX ADMIN — PIPERACILLIN AND TAZOBACTAM 200 GRAM(S): 4; .5 INJECTION, POWDER, LYOPHILIZED, FOR SOLUTION INTRAVENOUS at 22:09

## 2022-03-17 RX ADMIN — CHLORHEXIDINE GLUCONATE 1 APPLICATION(S): 213 SOLUTION TOPICAL at 06:30

## 2022-03-17 RX ADMIN — SODIUM CHLORIDE 100 MILLILITER(S): 9 INJECTION, SOLUTION INTRAVENOUS at 23:45

## 2022-03-17 RX ADMIN — SODIUM CHLORIDE 1000 MILLILITER(S): 9 INJECTION, SOLUTION INTRAVENOUS at 02:00

## 2022-03-17 RX ADMIN — Medication 100 MILLIGRAM(S): at 22:19

## 2022-03-17 RX ADMIN — SODIUM CHLORIDE 1000 MILLILITER(S): 9 INJECTION, SOLUTION INTRAVENOUS at 06:46

## 2022-03-17 RX ADMIN — Medication 1 APPLICATION(S): at 13:54

## 2022-03-17 RX ADMIN — FENTANYL CITRATE 21 MICROGRAM(S)/KG/HR: 50 INJECTION INTRAVENOUS at 05:46

## 2022-03-17 RX ADMIN — PIPERACILLIN AND TAZOBACTAM 200 GRAM(S): 4; .5 INJECTION, POWDER, LYOPHILIZED, FOR SOLUTION INTRAVENOUS at 05:47

## 2022-03-17 RX ADMIN — Medication 250 MILLIGRAM(S): at 00:07

## 2022-03-17 RX ADMIN — CHLORHEXIDINE GLUCONATE 15 MILLILITER(S): 213 SOLUTION TOPICAL at 17:24

## 2022-03-17 RX ADMIN — CHLORHEXIDINE GLUCONATE 15 MILLILITER(S): 213 SOLUTION TOPICAL at 05:47

## 2022-03-17 RX ADMIN — PIPERACILLIN AND TAZOBACTAM 200 GRAM(S): 4; .5 INJECTION, POWDER, LYOPHILIZED, FOR SOLUTION INTRAVENOUS at 13:53

## 2022-03-17 NOTE — PROGRESS NOTE ADULT - ASSESSMENT
40 yo M and family reports hx craniotomy (family at bedside unsure why he had craniotomy), sz d/o, DM, pancreatitis and etoh abuse admitted to ICU w/ cardiac arrest ROSC 10min. Apparently hypoglycemic which may have triggered episode. Unclear if had sz prior to event that may have triggered. Also concerning that pt w/ jaundice and severe liver dysfxn (given hx likely from etoh abuse) that may have played component in hypoglycemia    Neuro  sedation vacation daily as tolerated to assess mental status  needs some sedation as has vent dyssynchrony  maintain normothermia  check eeg  cont keppra  check ammonia level      Pulm  cont vent support  maintain sat>90% by titrating fio2/peep    CVS  wean down levophed as low dose  maintain map >65  echo pending  ekg w/o ischemia  LA severely high given liver/renal dysfxn    GI  f/u US  concern for etoh cirrhosis  can check w/ Gi regarding other etiologies for liver dysfxn  check tsat for hemochromatosis    Endo  keep FS <200 but given hypoglycemia that may have caused cardiac arrest will avoid too tight control    Renal   JHONATAN fro atn from cardiac arrest  monitor uo and lytes    Patient admitted to the ICU for management of acute hypoxic respiratory failure, post cardiac arrest, encephalopathy, JHONATAN, transaminitis, hypoglycemia, lactic acidosis, and severe metabolic acidosis.      (16 Mar 2022 22:33) 40 yo M and family reports hx craniotomy (family at bedside unsure why he had craniotomy), sz d/o, DM, pancreatitis and etoh abuse admitted to ICU w/ cardiac arrest ROSC 10min. Apparently hypoglycemic which may have triggered episode. Unclear if had sz prior to event that may have triggered. Also concerning that pt w/ jaundice and severe liver dysfxn (given hx likely from etoh abuse) that may have played component in hypoglycemia    Neuro  sedation vacation daily as tolerated to assess mental status  needs some sedation as has vent dyssynchrony  maintain normothermia  check eeg  cont keppra  check ammonia level      Pulm  cont vent support  maintain sat>90% by titrating fio2/peep    CVS  wean down levophed as low dose  maintain map >65  echo pending  ekg w/o ischemia  LA severely high given liver/renal dysfxn    GI  f/u US  concern for etoh cirrhosis  can check w/ Gi regarding other etiologies for liver dysfxn  check tsat for hemochromatosis    Endo  keep FS <200 but given hypoglycemia that may have caused cardiac arrest will avoid too tight control    Renal   JHONATAN fro atn from cardiac arrest  monitor uo and lytes    ID  empiric zosyn, f/u cx    Prognosis guarded given cardiac arrest and mosf

## 2022-03-17 NOTE — PROGRESS NOTE ADULT - SUBJECTIVE AND OBJECTIVE BOX
HPI:    * History obtained from chart review and ED physician as pt is currently intubated and no family available at this time.     41 year old man with a PMHx of seizure (on keppra). Family called EMS after finding pt unresponsive in his bed. On arrival EMS reports pt was spontaneously breathing and had a pulse but was only responsive to pain. Finger stick was 23. Per EMS, pt went into witnessed arrest shortly there after, ACLS immediately initiated, ROSC achieved after about 5-10 mins. No shocks given. On arrival to ED pt in sinus tachy, glucose in 200s, hypotensive to 70s systolic. Levophed drip was started. Patient started biting on the ETT and fentanyl drip was ordered.   CT Head done reported no acute intracranial hemorrhage or mass effect and no evidence of diffuse global anoxic injury on head CT.    Patient admitted to the ICU for management of acute hypoxic respiratory failure, post cardiac arrest, encephalopathy, JHONATAN, transaminitis, hypoglycemia, lactic acidosis, and severe metabolic acidosis.      (16 Mar 2022 22:33)      24 hr events:    no arrhythmias noted      ## Labs:  CBC:                        10.7   6.00  )-----------( 78       ( 17 Mar 2022 05:45 )             31.1     Chem:  03-17    142  |  100  |  35<H>  ----------------------------<  127<H>  3.1<L>   |  20<L>  |  2.65<H>    Ca    8.1<L>      17 Mar 2022 10:29  Phos  1.5     03-17  Mg     1.9     03-17    TPro  5.4<L>  /  Alb  2.9<L>  /  TBili  4.3<H>  /  DBili  3.3<H>  /  AST  1025<H>  /  ALT  141<H>  /  AlkPhos  92  03-17    Coags:  PT/INR - ( 17 Mar 2022 00:39 )   PT: 16.7 sec;   INR: 1.40 ratio         PTT - ( 17 Mar 2022 00:39 )  PTT:35.7 sec             ## Medications:  piperacillin/tazobactam IVPB... 3.375 Gram(s) IV Intermittent every 8 hours    norepinephrine Infusion 0.05 MICROgram(s)/kG/Min IV Continuous <Continuous>        heparin   Injectable 5000 Unit(s) SubCutaneous every 12 hours    pantoprazole  Injectable 40 milliGRAM(s) IV Push daily    dexMEDEtomidine Infusion 0.7 MICROgram(s)/kG/Hr IV Continuous <Continuous>  fentaNYL   Infusion. 3 MICROgram(s)/kG/Hr IV Continuous <Continuous>  levETIRAcetam  IVPB 750 milliGRAM(s) IV Intermittent every 12 hours      ## Vitals:  T(C): 37.8 (03-17-22 @ 07:00), Max: 37.8 (03-17-22 @ 07:00)  HR: 106 (03-17-22 @ 14:30) (72 - 140)  BP: 124/69 (03-17-22 @ 14:30) (70/25 - 154/86)  BP(mean): 80 (03-17-22 @ 14:30) (54 - 103)  RR: 20 (03-17-22 @ 14:30) (15 - 32)  SpO2: 99% (03-17-22 @ 14:30) (98% - 100%)  Wt(kg): --  Vent: Mode: AC/ CMV (Assist Control/ Continuous Mandatory Ventilation), RR (machine): 20, RR (patient): 24, TV (machine): 450, FiO2: 25, PEEP: 5, PIP: 19  ABG: ABG - ( 17 Mar 2022 05:43 )  pH, Arterial: 7.44  pH, Blood: x     /  pCO2: 24    /  pO2: 149   / HCO3: 16    / Base Excess: -6.4  /  SaO2: 99.6                  03-16 @ 07:01  -  03-17 @ 07:00  --------------------------------------------------------  IN: 2716.2 mL / OUT: 165 mL / NET: 2551.2 mL    03-17 @ 07:01  -  03-17 @ 15:07  --------------------------------------------------------  IN: 52.6 mL / OUT: 100 mL / NET: -47.4 mL          ## P/E:  Gen: intubated, NAD  HEENT: pupils reactive sluggish   Lungs: b/l cta  Heart: s1s2 reg no murmur  Abd: +BS soft nontender, enlarged liver  Ext: no edema  Neuro: opens eyes, not tracking, not following commands but withdraws ext to stimuli      < from: CT Head No Cont (03.16.22 @ 21:55) >    ACC: [id number] EXAM:  CT BRAIN                          PROCEDURE DATE:  03/16/2022          INTERPRETATION:  NONCONTRAST CT OF THE BRAIN DATED 3/16/2022.    CLINICAL INFORMATION:  Seizures, unresponsive. Cardiac arrest.    TECHNIQUE: Axial CT images are obtained from the cranial vertex to the   skullbase without the administration of IV contrast. Images are   reformatted in sagittal and coronal planes.    No prior studies are available for comparison.    FINDINGS:    There is a right temporal craniotomy with resection cavity and   encephalomalacia/gliosis in the right anterior temporal lobe. There is no   acute intra-axial or extra-axial hemorrhage. There is no significant mass   effect or shift of the midline. The basal cisterns are not effaced. There   is mild cerebral volume loss with prominence of the ventricles and sulci.   Gray-white matter differentiation is grossly preserved. There are mild   atherosclerotic calcifications of the intracranial carotid arteries.    The regional soft tissues are unremarkable. The visualized paranasal   sinuses and tympanic/mastoid cavities are clear.    IMPRESSION:    No acute intracranial hemorrhage or mass effect. No evidence of diffuse   global anoxic injury on head CT.    Right temporal craniotomy with resection cavity and   encephalomalacia/gliosis in the right anterior temporal lobe.    --- End of Report ---            BRETT WOOD DO; Attending Radiologist  This document has been electronically signed. Mar 16 2022 10:12PM    < end of copied text >      < from: Xray Chest 1 View- PORTABLE-Urgent (Xray Chest 1 View- PORTABLE-Urgent .) (03.16.22 @ 21:47) >    ACC: [id number] EXAM:  XR CHEST PORTABLE URGENT 1V                          PROCEDURE DATE:  03/16/2022          INTERPRETATION:  TIME OF EXAM: March 16, 2022 at 9:30 PM.    CLINICAL INFORMATION: ET tube check.    COMPARISON:  None available    TECHNIQUE:   AP Portable chest x-ray. Lordotic and rotated. External   cardiac pacer pads project over and obscures portions of the image.    INTERPRETATION:    The heart is not enlarged. ET tube tip is approximately 4.5 cm above the   addie.  Enteric tube extends into the left hemiabdomen. Tip not included on image.  The lungs are clear.  No pleural effusion or pneumothorax is seen.  No acute bony abnormality is noted.      IMPRESSION:  ET tube tip approximately 4.5 cm above the addie.    Enteric tube extends into left hemiabdomen. Tip not included on image.    Clear lungs.    --- End of Report ---            RICHELLE RM MD; Attending Radiologist  This document has been electronically signed. Mar 17 2022 11:02AM    < end of copied text >

## 2022-03-18 DIAGNOSIS — E11.9 TYPE 2 DIABETES MELLITUS WITHOUT COMPLICATIONS: ICD-10-CM

## 2022-03-18 DIAGNOSIS — E03.9 HYPOTHYROIDISM, UNSPECIFIED: ICD-10-CM

## 2022-03-18 LAB
A1C WITH ESTIMATED AVERAGE GLUCOSE RESULT: 5 % — SIGNIFICANT CHANGE UP (ref 4–5.6)
ALBUMIN SERPL ELPH-MCNC: 2.7 G/DL — LOW (ref 3.3–5)
ALBUMIN SERPL ELPH-MCNC: 3 G/DL — LOW (ref 3.3–5)
ALP SERPL-CCNC: 61 U/L — SIGNIFICANT CHANGE UP (ref 40–120)
ALP SERPL-CCNC: 82 U/L — SIGNIFICANT CHANGE UP (ref 40–120)
ALT FLD-CCNC: 114 U/L — HIGH (ref 12–78)
ALT FLD-CCNC: 139 U/L — HIGH (ref 12–78)
AMMONIA BLD-MCNC: 57 UMOL/L — HIGH (ref 11–32)
ANION GAP SERPL CALC-SCNC: 10 MMOL/L — SIGNIFICANT CHANGE UP (ref 5–17)
ANION GAP SERPL CALC-SCNC: 15 MMOL/L — SIGNIFICANT CHANGE UP (ref 5–17)
AST SERPL-CCNC: 655 U/L — HIGH (ref 15–37)
AST SERPL-CCNC: 674 U/L — HIGH (ref 15–37)
BILIRUB SERPL-MCNC: 3.4 MG/DL — HIGH (ref 0.2–1.2)
BILIRUB SERPL-MCNC: 4.5 MG/DL — HIGH (ref 0.2–1.2)
BUN SERPL-MCNC: 37 MG/DL — HIGH (ref 7–23)
BUN SERPL-MCNC: 41 MG/DL — HIGH (ref 7–23)
CALCIUM SERPL-MCNC: 7.6 MG/DL — LOW (ref 8.5–10.1)
CALCIUM SERPL-MCNC: 7.7 MG/DL — LOW (ref 8.5–10.1)
CHLORIDE SERPL-SCNC: 101 MMOL/L — SIGNIFICANT CHANGE UP (ref 96–108)
CHLORIDE SERPL-SCNC: 108 MMOL/L — SIGNIFICANT CHANGE UP (ref 96–108)
CO2 SERPL-SCNC: 25 MMOL/L — SIGNIFICANT CHANGE UP (ref 22–31)
CO2 SERPL-SCNC: 27 MMOL/L — SIGNIFICANT CHANGE UP (ref 22–31)
CREAT SERPL-MCNC: 1.52 MG/DL — HIGH (ref 0.5–1.3)
CREAT SERPL-MCNC: 1.97 MG/DL — HIGH (ref 0.5–1.3)
CULTURE RESULTS: NO GROWTH — SIGNIFICANT CHANGE UP
EGFR: 43 ML/MIN/1.73M2 — LOW
EGFR: 59 ML/MIN/1.73M2 — LOW
ESTIMATED AVERAGE GLUCOSE: 97 MG/DL — SIGNIFICANT CHANGE UP (ref 68–114)
GAS PNL BLDA: SIGNIFICANT CHANGE UP
GAS PNL BLDA: SIGNIFICANT CHANGE UP
GLUCOSE BLDC GLUCOMTR-MCNC: 168 MG/DL — HIGH (ref 70–99)
GLUCOSE BLDC GLUCOMTR-MCNC: 173 MG/DL — HIGH (ref 70–99)
GLUCOSE BLDC GLUCOMTR-MCNC: 183 MG/DL — HIGH (ref 70–99)
GLUCOSE BLDC GLUCOMTR-MCNC: 228 MG/DL — HIGH (ref 70–99)
GLUCOSE BLDC GLUCOMTR-MCNC: 270 MG/DL — HIGH (ref 70–99)
GLUCOSE SERPL-MCNC: 152 MG/DL — HIGH (ref 70–99)
GLUCOSE SERPL-MCNC: 189 MG/DL — HIGH (ref 70–99)
HCT VFR BLD CALC: 25.8 % — LOW (ref 39–50)
HGB BLD-MCNC: 8.9 G/DL — LOW (ref 13–17)
IRON SATN MFR SERPL: 111 UG/DL — SIGNIFICANT CHANGE UP (ref 45–165)
IRON SATN MFR SERPL: 83 % — HIGH (ref 16–55)
LACTATE SERPL-SCNC: 2 MMOL/L — SIGNIFICANT CHANGE UP (ref 0.7–2)
MAGNESIUM SERPL-MCNC: 1.8 MG/DL — SIGNIFICANT CHANGE UP (ref 1.6–2.6)
MAGNESIUM SERPL-MCNC: 1.8 MG/DL — SIGNIFICANT CHANGE UP (ref 1.6–2.6)
MCHC RBC-ENTMCNC: 34.5 G/DL — SIGNIFICANT CHANGE UP (ref 32–36)
MCHC RBC-ENTMCNC: 36.2 PG — HIGH (ref 27–34)
MCV RBC AUTO: 104.9 FL — HIGH (ref 80–100)
NRBC # BLD: 1 /100 WBCS — HIGH (ref 0–0)
PHOSPHATE SERPL-MCNC: 0.7 MG/DL — CRITICAL LOW (ref 2.5–4.5)
PHOSPHATE SERPL-MCNC: 1.4 MG/DL — LOW (ref 2.5–4.5)
PLATELET # BLD AUTO: 52 K/UL — LOW (ref 150–400)
POTASSIUM SERPL-MCNC: 3.3 MMOL/L — LOW (ref 3.5–5.3)
POTASSIUM SERPL-MCNC: 3.9 MMOL/L — SIGNIFICANT CHANGE UP (ref 3.5–5.3)
POTASSIUM SERPL-SCNC: 3.3 MMOL/L — LOW (ref 3.5–5.3)
POTASSIUM SERPL-SCNC: 3.9 MMOL/L — SIGNIFICANT CHANGE UP (ref 3.5–5.3)
PROT SERPL-MCNC: 4.8 GM/DL — LOW (ref 6–8.3)
PROT SERPL-MCNC: 5.2 GM/DL — LOW (ref 6–8.3)
RBC # BLD: 2.46 M/UL — LOW (ref 4.2–5.8)
RBC # FLD: 13.8 % — SIGNIFICANT CHANGE UP (ref 10.3–14.5)
SODIUM SERPL-SCNC: 141 MMOL/L — SIGNIFICANT CHANGE UP (ref 135–145)
SODIUM SERPL-SCNC: 145 MMOL/L — SIGNIFICANT CHANGE UP (ref 135–145)
SPECIMEN SOURCE: SIGNIFICANT CHANGE UP
TIBC SERPL-MCNC: 134 UG/DL — LOW (ref 220–430)
UIBC SERPL-MCNC: 23 UG/DL — LOW (ref 110–370)
VANCOMYCIN FLD-MCNC: 10.9 UG/ML — SIGNIFICANT CHANGE UP
WBC # BLD: 5.43 K/UL — SIGNIFICANT CHANGE UP (ref 3.8–10.5)
WBC # FLD AUTO: 5.43 K/UL — SIGNIFICANT CHANGE UP (ref 3.8–10.5)

## 2022-03-18 PROCEDURE — 95816 EEG AWAKE AND DROWSY: CPT | Mod: 26

## 2022-03-18 PROCEDURE — 99291 CRITICAL CARE FIRST HOUR: CPT

## 2022-03-18 RX ORDER — POTASSIUM PHOSPHATE, MONOBASIC POTASSIUM PHOSPHATE, DIBASIC 236; 224 MG/ML; MG/ML
30 INJECTION, SOLUTION INTRAVENOUS ONCE
Refills: 0 | Status: COMPLETED | OUTPATIENT
Start: 2022-03-18 | End: 2022-03-18

## 2022-03-18 RX ORDER — VANCOMYCIN HCL 1 G
1000 VIAL (EA) INTRAVENOUS ONCE
Refills: 0 | Status: COMPLETED | OUTPATIENT
Start: 2022-03-18 | End: 2022-03-18

## 2022-03-18 RX ORDER — POTASSIUM CHLORIDE 20 MEQ
40 PACKET (EA) ORAL ONCE
Refills: 0 | Status: COMPLETED | OUTPATIENT
Start: 2022-03-18 | End: 2022-03-18

## 2022-03-18 RX ORDER — SODIUM,POTASSIUM PHOSPHATES 278-250MG
1 POWDER IN PACKET (EA) ORAL ONCE
Refills: 0 | Status: COMPLETED | OUTPATIENT
Start: 2022-03-18 | End: 2022-03-18

## 2022-03-18 RX ORDER — MIDODRINE HYDROCHLORIDE 2.5 MG/1
10 TABLET ORAL EVERY 8 HOURS
Refills: 0 | Status: DISCONTINUED | OUTPATIENT
Start: 2022-03-18 | End: 2022-03-20

## 2022-03-18 RX ORDER — MAGNESIUM SULFATE 500 MG/ML
1 VIAL (ML) INJECTION ONCE
Refills: 0 | Status: COMPLETED | OUTPATIENT
Start: 2022-03-18 | End: 2022-03-18

## 2022-03-18 RX ADMIN — DEXMEDETOMIDINE HYDROCHLORIDE IN 0.9% SODIUM CHLORIDE 9.56 MICROGRAM(S)/KG/HR: 4 INJECTION INTRAVENOUS at 07:05

## 2022-03-18 RX ADMIN — DEXMEDETOMIDINE HYDROCHLORIDE IN 0.9% SODIUM CHLORIDE 9.56 MICROGRAM(S)/KG/HR: 4 INJECTION INTRAVENOUS at 11:18

## 2022-03-18 RX ADMIN — LACTULOSE 10 GRAM(S): 10 SOLUTION ORAL at 18:51

## 2022-03-18 RX ADMIN — Medication 125 MILLILITER(S): at 01:00

## 2022-03-18 RX ADMIN — Medication 500 MILLIGRAM(S): at 11:18

## 2022-03-18 RX ADMIN — Medication 40 MILLIEQUIVALENT(S): at 04:35

## 2022-03-18 RX ADMIN — Medication 1 TABLET(S): at 11:32

## 2022-03-18 RX ADMIN — CHLORHEXIDINE GLUCONATE 15 MILLILITER(S): 213 SOLUTION TOPICAL at 05:42

## 2022-03-18 RX ADMIN — SODIUM CHLORIDE 50 MILLILITER(S): 9 INJECTION, SOLUTION INTRAVENOUS at 11:31

## 2022-03-18 RX ADMIN — Medication 1 MILLIGRAM(S): at 12:07

## 2022-03-18 RX ADMIN — DEXMEDETOMIDINE HYDROCHLORIDE IN 0.9% SODIUM CHLORIDE 9.56 MICROGRAM(S)/KG/HR: 4 INJECTION INTRAVENOUS at 02:21

## 2022-03-18 RX ADMIN — FENTANYL CITRATE 21 MICROGRAM(S)/KG/HR: 50 INJECTION INTRAVENOUS at 11:18

## 2022-03-18 RX ADMIN — LACTULOSE 10 GRAM(S): 10 SOLUTION ORAL at 05:42

## 2022-03-18 RX ADMIN — CHLORHEXIDINE GLUCONATE 15 MILLILITER(S): 213 SOLUTION TOPICAL at 18:51

## 2022-03-18 RX ADMIN — Medication 125 MILLILITER(S): at 12:07

## 2022-03-18 RX ADMIN — Medication 25 MICROGRAM(S): at 05:42

## 2022-03-18 RX ADMIN — LEVETIRACETAM 400 MILLIGRAM(S): 250 TABLET, FILM COATED ORAL at 21:56

## 2022-03-18 RX ADMIN — Medication 1 APPLICATION(S): at 21:32

## 2022-03-18 RX ADMIN — Medication 125 MILLILITER(S): at 05:43

## 2022-03-18 RX ADMIN — PIPERACILLIN AND TAZOBACTAM 200 GRAM(S): 4; .5 INJECTION, POWDER, LYOPHILIZED, FOR SOLUTION INTRAVENOUS at 05:42

## 2022-03-18 RX ADMIN — MIDODRINE HYDROCHLORIDE 10 MILLIGRAM(S): 2.5 TABLET ORAL at 14:16

## 2022-03-18 RX ADMIN — CHLORHEXIDINE GLUCONATE 1 APPLICATION(S): 213 SOLUTION TOPICAL at 11:31

## 2022-03-18 RX ADMIN — Medication 1 PACKET(S): at 06:31

## 2022-03-18 RX ADMIN — POTASSIUM PHOSPHATE, MONOBASIC POTASSIUM PHOSPHATE, DIBASIC 83.33 MILLIMOLE(S): 236; 224 INJECTION, SOLUTION INTRAVENOUS at 21:56

## 2022-03-18 RX ADMIN — Medication 100 GRAM(S): at 05:48

## 2022-03-18 RX ADMIN — Medication 100 MILLIGRAM(S): at 11:18

## 2022-03-18 RX ADMIN — Medication 250 MILLIGRAM(S): at 09:45

## 2022-03-18 RX ADMIN — PIPERACILLIN AND TAZOBACTAM 200 GRAM(S): 4; .5 INJECTION, POWDER, LYOPHILIZED, FOR SOLUTION INTRAVENOUS at 21:32

## 2022-03-18 RX ADMIN — PANTOPRAZOLE SODIUM 40 MILLIGRAM(S): 20 TABLET, DELAYED RELEASE ORAL at 11:19

## 2022-03-18 RX ADMIN — Medication 1 APPLICATION(S): at 14:16

## 2022-03-18 RX ADMIN — DEXMEDETOMIDINE HYDROCHLORIDE IN 0.9% SODIUM CHLORIDE 9.56 MICROGRAM(S)/KG/HR: 4 INJECTION INTRAVENOUS at 08:41

## 2022-03-18 RX ADMIN — Medication 2 MILLIGRAM(S): at 03:00

## 2022-03-18 RX ADMIN — PIPERACILLIN AND TAZOBACTAM 200 GRAM(S): 4; .5 INJECTION, POWDER, LYOPHILIZED, FOR SOLUTION INTRAVENOUS at 14:16

## 2022-03-18 RX ADMIN — MIDODRINE HYDROCHLORIDE 10 MILLIGRAM(S): 2.5 TABLET ORAL at 21:32

## 2022-03-18 RX ADMIN — Medication 1 APPLICATION(S): at 05:45

## 2022-03-18 RX ADMIN — Medication 6.56 MICROGRAM(S)/KG/MIN: at 12:37

## 2022-03-18 RX ADMIN — POTASSIUM PHOSPHATE, MONOBASIC POTASSIUM PHOSPHATE, DIBASIC 83.33 MILLIMOLE(S): 236; 224 INJECTION, SOLUTION INTRAVENOUS at 06:31

## 2022-03-18 RX ADMIN — LEVETIRACETAM 400 MILLIGRAM(S): 250 TABLET, FILM COATED ORAL at 10:54

## 2022-03-18 RX ADMIN — DEXMEDETOMIDINE HYDROCHLORIDE IN 0.9% SODIUM CHLORIDE 9.56 MICROGRAM(S)/KG/HR: 4 INJECTION INTRAVENOUS at 04:38

## 2022-03-18 RX ADMIN — FENTANYL CITRATE 21 MICROGRAM(S)/KG/HR: 50 INJECTION INTRAVENOUS at 02:20

## 2022-03-18 NOTE — DIETITIAN INITIAL EVALUATION ADULT. - ENTERAL
Vital AF 1.2 @ 20-> 50 ml/hr =1200 ml, 1440 calories, 90 grams protein, 973 ml free water, 101% RDIs (trickle feeds) as medically feasible

## 2022-03-18 NOTE — DIETITIAN INITIAL EVALUATION ADULT. - DIET TYPE
Vital AF 1.2 @ 20-> 50 ml/hr =1200 ml, 1440 calories, 90 grams protein, 973 ml free water, 101% RDIs  (trickle feeds) as medically feasible/NPO with tube feedings

## 2022-03-18 NOTE — DIETITIAN INITIAL EVALUATION ADULT. - OTHER INFO
Pt seen in ICU for management of Acute hypoxic respiratory failure , post cardiac arrest , encephalopathy, JHONATAN , transaminitis, hypoglycemia, lactic acidosis & severe metabolic acidosis. PMH DM ; pancreatitis  & ETOH Abuse. Unable to obtain NFPE as pt vent / sedated and NPO to LWS.

## 2022-03-18 NOTE — CONSULT NOTE ADULT - PROBLEM SELECTOR RECOMMENDATION 9
Continue with 25 MCG of levothyroxine.  Patient will need more.  Ultimately the TSH should be corrected to 2.0 also.  Patient is also on Keppra which may hasten the metabolism of levothyroxine at the hepatic level and he may require more levothyroxine to achieve the same TSH.  The recovery of TSH may lag behind time wise compared to  thyroid hormones like T4 and T3

## 2022-03-18 NOTE — PROGRESS NOTE ADULT - ASSESSMENT
41M PMH DM dx 3 months ago per family, seizure disorder, hx craniotomy (family at bedside unsure why he had craniotomy), ETOH abuse (daily drinker), pancreatitis denies hx of ETOH withdrawal by family presents after being found unresponsive. Reported by EMS to be hypoglycemic to the 20s then with witnessed cardiac arrest ROSC 10min with EMS. Unclear if pt had sz prior to event leading to unresponsiveness vs duration of hypoglycemia prior to EMS arrival.     DX: cardiac arrest, respiratory arrest/acute respiratory failure requiring intubation, elevated LFTs, hyperbilirubinemia, seizure disorder, hypoglycemia, DM, ETOH abuse    Neuro  wean off sedation to assess mental status  follow up eeg  cont keppra for seizure disoder  ammonia level down trending  cont rifaximin and lactulose for possible component of hepatic encephalopathy      Pulm  cont vent support  maintain sat>90%   start daily weaning as tolerates    CVS  weaned off levophed  maintain map >65  hypotensive today will reinitiate levo and start midodrine for BP support    GI  ETOH abuse  cont rifaximin and lactulose for elevated ammonia level  elevated LFTs likely from ETOh abuse and liver disease but may also be from some ischemic injury with cardiac arrest  elevated t bili with noted sclera icterus  monitor lfts and t bili  supportive care for now    Endo  initial hypoglycemia  avoid further hypoglycemic events  blood sugar stable at present time      Renal   acute renal insufficiency with ATN from cardiac arrest  monitor uo and lytes  supplement hypophosphatemia and hypokalemia  monitor Cr    ID  on empiric antibiotics coverage with zosyn  cultures negative to date    GEN  spoke to wife and brother at bedside  update given to family  they were made aware that prognosis is guarded and awaiting to see neurologic recovery post arrest  at present time pt remains full code  DVT prophylaxis: bilateral compression devices  echo with normal LV function

## 2022-03-18 NOTE — DIETITIAN INITIAL EVALUATION ADULT. - PERTINENT LABORATORY DATA
03-18 Na141 mmol/L Glu 152 mg/dL<H> K+ 3.3 mmol/L<L> Cr  1.97 mg/dL<H> BUN 37 mg/dL<H> 03-18 Phos 0.7 mg/dL<LL> 03-18 Alb 2.7 g/dL<L>03-18  U/L<H>  U/L<H> Alkaline Phosphatase 61 U/[id number] @ 09:07 A1C 5.0

## 2022-03-18 NOTE — PROGRESS NOTE ADULT - SUBJECTIVE AND OBJECTIVE BOX
24 hr events:  remains intubated  sedated  weaned off levophed  hypotensive today  LFTS elevated    ## ROS:  [x ] unable to obtain due to intubation/sedation      ## Labs:  CBC:                       8.9    5.43  )-----------( 52       ( 18 Mar 2022 03:07 )             25.8     Chem:  Comprehensive Metabolic Panel (03.18.22 @ 03:07)    Sodium, Serum: 141 mmol/L    Potassium, Serum: 3.3 mmol/L    Chloride, Serum: 101 mmol/L    Carbon Dioxide, Serum: 25 mmol/L    Anion Gap, Serum: 15 mmol/L    Blood Urea Nitrogen, Serum: 37 mg/dL    Creatinine, Serum: 1.97 mg/dL    Glucose, Serum: 152 mg/dL    Calcium, Total Serum: 7.7 mg/dL    Protein Total, Serum: 4.8 gm/dL    Albumin, Serum: 2.7 g/dL    Bilirubin Total, Serum: 3.4 mg/dL    Alkaline Phosphatase, Serum: 61 U/L    Aspartate Aminotransferase (AST/SGOT): 655 U/L    Alanine Aminotransferase (ALT/SGPT): 114 U/L      Ammonia, Serum (03.17.22 @ 10:26)    Ammonia, Serum: 91 umol/L    Ammonia, Serum (03.18.22 @ 03:07)    Ammonia, Serum: 57 umol/L        ## Imaging:  CT head < from: CT Head No Cont (03.16.22 @ 21:55) >  No acute intracranial hemorrhage or mass effect. No evidence of diffuse   global anoxic injury on head CT.    Right temporal craniotomy with resection cavity and   encephalomalacia/gliosis in the right anterior temporal lobe.    CXR < from: Xray Chest 1 View- PORTABLE-Urgent (Xray Chest 1 View- PORTABLE-Urgent .) (03.16.22 @ 21:47) >   ET tube tip approximately 4.5 cm above the addie.    Enteric tube extends into left hemiabdomen. Tip not included on image.    Clear lungs.      ## Medications:  piperacillin/tazobactam IVPB... 3.375 Gram(s) IV Intermittent every 8 hours  rifAXIMin 550 milliGRAM(s) Oral two times a day    midodrine 10 milliGRAM(s) Oral every 8 hours      levothyroxine 25 MICROGram(s) Oral daily      lactulose Syrup 10 Gram(s) Oral every 12 hours  pantoprazole  Injectable 40 milliGRAM(s) IV Push daily    levETIRAcetam  IVPB 750 milliGRAM(s) IV Intermittent every 12 hours      ## Vitals:  T(C): 37.6 (03-18-22 @ 20:00), Max: 37.6 (03-18-22 @ 20:00)  HR: 63 (03-18-22 @ 22:00) (63 - 104)  BP: 98/66 (03-18-22 @ 22:00) (81/58 - 134/93)  RR: 14 (03-18-22 @ 22:00) (0 - 21)  SpO2: 100% (03-18-22 @ 22:00) (91% - 100%)        ABG: ABG - ( 18 Mar 2022 12:46 )  pH, Arterial: 7.42  pH, Blood: x     /  pCO2: 42    /  pO2: 110   / HCO3: 27    / Base Excess: 2.5   /  SaO2: 99.3          ## P/E:  Gen: lying comfortably in bed in no apparent distress, sedated/unresponsive  HEENT: PERRL, scleral icterus, ETT in place  Resp: CTA B/L, mechanical breath sounds  CVS: RRR  Abd: soft ND +BS  Ext: no c/c/e  Neuro: sedated, not following commands, + brain stem reflexes: + pupillary reflex/corneals/gag    CENTRAL LINE: [ ] YES [x ] NO  LOCATION:   DATE INSERTED:  REMOVE: [ ] YES [ ] NO      ANTUNEZ: [ ] YES [ ] NO    DATE INSERTED:  REMOVE:  [ ] YES [ ] NO      A-LINE:  [ ] YES [x ] NO  LOCATION:   DATE INSERTED:  REMOVE:  [ ] YES [ ] NO  EXPLAIN:    GLOBAL ISSUE/BEST PRACTICE:  Analgesia: n/a  Sedation: yes  HOB elevation: yes  Stress ulcer prophylaxis: yes  VTE prophylaxis: bilateral compression devices due to thrombocytopenia  Oral Care: yes  Glycemic control: yes  Nutrition: tube feeds    CODE STATUS: [x ] full code  [ ] DNR  [ ] DNI  [ ] MOLST  Goals of care discussion: [x ] yes

## 2022-03-18 NOTE — CONSULT NOTE ADULT - PROBLEM SELECTOR RECOMMENDATION 2
Continue off any diabetic medications.  Check HbA1c and only an HbA1c >7.0 should be actively treated and managed inpatient  Thank you for the courtesy of this consultation.

## 2022-03-18 NOTE — EEG REPORT - NS EEG TEXT BOX
REPORT OF ROUTINE EEG WITH VIDEO  [facility]: [address], [address] [place] [address], Phone: [phone] [facility]: [phone] [place] [address], [place] [address], Phone: [phone] Office: [address] [place] [address], [place] [address], [place] [address], Phone: [phone]  Patient Name: JOCELYN JOSEPH   Age: 41 year : 1980 MRN #: -, Location: ICU 8 Referring Physician: -  EEG #: [id number] Study Date: 3/18/2022   Start Time: 9:37:48 AM    Study Duration: 20.4 		  Technical Information:					 On Instrument: - Placement and Labeling of Electrodes: The EEG was performed utilizing 20 channels referential EEG connections (coronal over temporal over parasagittal montage) using all standard 10-20 electrode placements with EKG.  Recording was at a sampling rate of 256 samples per second per channel.  Time synchronized digital video recording was done simultaneously with EEG recording.  A low light infrared camera was used for low light recording.  Ken and seizure detection algorithms were utilized. CSA Technical Component: Quantitative EEG analysis using a separate Compressed Spectral Array (CSA) software package was conducted in real-time and run at bedside after set up by the technician, digitally displaying the power of electrographic frequencies included in the 1-30Hz band using a graded color map.  This data was reviewed and interpreted independently, and is reported in a separate section below.  History:  ROUTINE PERFORMED AT BEDSIDE COR: VENTED,SEDATED NO HV DUE TO PT STATUS PHOTIC PERFORMED 40 Y/O MALE P/W CARDIAC ARREST Hx: SEIZURE R/O SEIZURE  Medication Precedex Keppra (Levetiracetam) Fentanyl  Study Interpretation:  FINDINGS:  The background was continuous, invariant, and unreactive.  No PDR seen.  Background Slowing: Generalized slowing: diffuse theta/delta slowing Focal slowing: none  Sleep Background: Stage II sleep transients were not recorded.  Non-epileptiform activity: None.  Epileptiform Activity:  No epileptiform discharges were present.  Events: No clinical events were recorded. No seizures were recorded.  Activation Procedures:  -Hyperventilation was not performed.   -Photic stimulation was performed and did not elicit any abnormalities.    Artifacts: Intermittent myogenic and movement artifacts were noted.  ECG: The heart rate on single channel ECG was predominantly between 80-90 BPM.  EEG Classification / Summary:  Abnormal EEG in the sedated patient. -Severe generalized slowing  Clinical Impression:  Severe nonspecific diffuse or multifocal cerebral dysfunction.  No epileptiform pattern or seizure seen.  Preliminary Fellow Report, final report pending attending review  Blu Cunningham MD Epilepsy Fellow  Reading Room: [phone] On Call Service After Hours: [phone]    REPORT OF ROUTINE EEG WITH VIDEO  [facility]: [address], [address] [place] [address], Phone: [phone] [facility]: [id number] [facility] [address], [place] [address], Phone: [phone] Office: [address] [place] [address], [place] [address], [place] [address], Phone: [phone]  Patient Name: JOCELYN JOSEPH   Age: 41 year : 1980 MRN #: -, Location: ICU 8 Referring Physician: -  EEG #: [id number] Study Date: 3/18/2022   Start Time: 9:37:48 AM    Study Duration: 20.4 		  Technical Information:					 On Instrument: - Placement and Labeling of Electrodes: The EEG was performed utilizing 20 channels referential EEG connections (coronal over temporal over parasagittal montage) using all standard 10-20 electrode placements with EKG.  Recording was at a sampling rate of 256 samples per second per channel.  Time synchronized digital video recording was done simultaneously with EEG recording.  A low light infrared camera was used for low light recording.  Ken and seizure detection algorithms were utilized. CSA Technical Component: Quantitative EEG analysis using a separate Compressed Spectral Array (CSA) software package was conducted in real-time and run at bedside after set up by the technician, digitally displaying the power of electrographic frequencies included in the 1-30Hz band using a graded color map.  This data was reviewed and interpreted independently, and is reported in a separate section below.  History:  ROUTINE PERFORMED AT BEDSIDE COR: VENTED,SEDATED NO HV DUE TO PT STATUS PHOTIC PERFORMED 40 Y/O MALE P/W CARDIAC ARREST Hx: SEIZURE R/O SEIZURE  Medication Precedex Keppra (Levetiracetam) Fentanyl  Study Interpretation:  FINDINGS:  The background was continuous, invariant, and unreactive.  No PDR seen.  Background Slowing: Generalized slowing: diffuse theta/delta slowing Focal slowing: none  Sleep Background: Stage II sleep transients were not recorded.  Non-epileptiform activity: None.  Epileptiform Activity:  -Frequent spikes in the right temporal region, maximum T8 -Rare spike in the left frontotemporal region, maximum F3/T7  Events: No clinical events were recorded. No seizures were recorded.  Activation Procedures:  -Hyperventilation was not performed.   -Photic stimulation was performed and did not elicit any abnormalities.    Artifacts: Intermittent myogenic and movement artifacts were noted.  ECG: The heart rate on single channel ECG was predominantly between 80-90 BPM.  EEG Classification / Summary:  Abnormal EEG in the sedated patient. -Frequent spikes in the right temporal region, maximum T8 -Rare spike in the left frontotemporal region, maximum F3/T7 -Severe generalized slowing  Clinical Impression:  Increased risk of focal seizures with onset in the right temporal region or left frontotemporal region. Severe nonspecific diffuse or multifocal cerebral dysfunction.  No seizures were recorded.   Blu Cunningham MD Epilepsy Fellow  Reading Room: [phone] On Call Service After Hours: [phone]

## 2022-03-18 NOTE — CONSULT NOTE ADULT - ASSESSMENT
The central ethical issue that exists in this case is (A) respecting the patient’s autonomy versus (B) the providers’ desire for beneficence and non-maleficence. The conflict that exists in this situation is one hospital clinicians commonly encounter for patients who have an undetermined prognosis for recovery. The bioethical principle of autonomy – here represented by a thoughtful consideration of the appropriate surrogate to make decisions for a patient without capacity. Physicians and health care providers have a role in protecting the patient from harm by safeguarding the patient’s best interests through determining the qualifications of the surrogate decision maker. In this case, the health team is applauded for doing due diligence to locate the patient’s surrogate decision maker and creating an atmosphere of clarity in communication regarding finding the appropriate primary surrogate decision maker.     As this patient has not appointed a [facility] Healthcare Proxy, the [facility] Family Health Care Decision Act needs to be consulted.   The 2010 [facility] Family Health Care Decision Act (FHCDA) addresses the situation of a sick individual who lacks capacity but has an available surrogate: the surrogate has the exclusive right to make decisions about medical care and discharge planning for the patient. The ethical standard the surrogate is supposed to apply to such decision-making is first “substituted judgment” – based on the patient’s prior articulation of preferences for such a situation or second “best interests.” The CDA establishes a hierarchy of surrogacy, in order of priority decision maker for the incapable patient:     – an [facility] Article 81 court-appointed guardian (in this case, there is not one);   – the spouse or domestic partner as defined in the CDA who is either ([facility] § 2994-a);   – adult children;   – a parent(s);   – a brother and/or sister;   – a close friend/all other family members (nieces, nephews, etc.)     RECOMMENDATION    At this juncture the patient's wife Tamika is considered the appropriated decision maker according to the Family Health Care Decision Act until proven otherwise. Ethics will remain available for decision points as they occur.    Thank you for this consult.    Nadiya Mcduffie D.Min., LUCRETIA-BSN, [facility]  Medical Ethicist  Division of Medical Ethics  Department of Medicine  [phone]      References  ROBIN BOYKIN. THE FAMILY HEALTH CARE DECISIONS ACT. [place] [employer] LAW JOURNAL,2010;15:32-35.

## 2022-03-18 NOTE — DIETITIAN INITIAL EVALUATION ADULT. - PERTINENT MEDS FT
MEDICATIONS  (STANDING):  albumin human  5% IVPB 250 milliLiter(s) IV Intermittent every 6 hours  ascorbic acid 500 milliGRAM(s) Oral daily  chlorhexidine 0.12% Liquid 15 milliLiter(s) Oral Mucosa every 12 hours  chlorhexidine 2% Cloths 1 Application(s) Topical daily  dexMEDEtomidine Infusion 0.7 MICROgram(s)/kG/Hr (9.56 mL/Hr) IV Continuous <Continuous>  dextrose 5% + lactated ringers. 1000 milliLiter(s) (50 mL/Hr) IV Continuous <Continuous>  fentaNYL   Infusion. 3 MICROgram(s)/kG/Hr (21 mL/Hr) IV Continuous <Continuous>  folic acid Injectable 1 milliGRAM(s) IV Push daily  lactulose Syrup 10 Gram(s) Oral every 12 hours  levETIRAcetam  IVPB 750 milliGRAM(s) IV Intermittent every 12 hours  levothyroxine 25 MICROGram(s) Oral daily  multivitamin 1 Tablet(s) Oral daily  norepinephrine Infusion 0.05 MICROgram(s)/kG/Min (6.56 mL/Hr) IV Continuous <Continuous>  pantoprazole  Injectable 40 milliGRAM(s) IV Push daily  petrolatum Ophthalmic Ointment 1 Application(s) Both EYES every 8 hours  piperacillin/tazobactam IVPB... 3.375 Gram(s) IV Intermittent every 8 hours  rifAXIMin 550 milliGRAM(s) Oral two times a day  thiamine Injectable 100 milliGRAM(s) IV Push daily    MEDICATIONS  (PRN):

## 2022-03-19 LAB
ALBUMIN SERPL ELPH-MCNC: 2.8 G/DL — LOW (ref 3.3–5)
ALP SERPL-CCNC: 120 U/L — SIGNIFICANT CHANGE UP (ref 40–120)
ALT FLD-CCNC: 155 U/L — HIGH (ref 12–78)
AMMONIA BLD-MCNC: 17 UMOL/L — SIGNIFICANT CHANGE UP (ref 11–32)
ANION GAP SERPL CALC-SCNC: 8 MMOL/L — SIGNIFICANT CHANGE UP (ref 5–17)
AST SERPL-CCNC: 710 U/L — HIGH (ref 15–37)
BILIRUB SERPL-MCNC: 5.8 MG/DL — HIGH (ref 0.2–1.2)
BUN SERPL-MCNC: 38 MG/DL — HIGH (ref 7–23)
CALCIUM SERPL-MCNC: 7.6 MG/DL — LOW (ref 8.5–10.1)
CHLORIDE SERPL-SCNC: 111 MMOL/L — HIGH (ref 96–108)
CO2 SERPL-SCNC: 26 MMOL/L — SIGNIFICANT CHANGE UP (ref 22–31)
CREAT SERPL-MCNC: 1.42 MG/DL — HIGH (ref 0.5–1.3)
CULTURE RESULTS: SIGNIFICANT CHANGE UP
EGFR: 64 ML/MIN/1.73M2 — SIGNIFICANT CHANGE UP
GLUCOSE BLDC GLUCOMTR-MCNC: 146 MG/DL — HIGH (ref 70–99)
GLUCOSE BLDC GLUCOMTR-MCNC: 150 MG/DL — HIGH (ref 70–99)
GLUCOSE BLDC GLUCOMTR-MCNC: 168 MG/DL — HIGH (ref 70–99)
GLUCOSE BLDC GLUCOMTR-MCNC: 178 MG/DL — HIGH (ref 70–99)
GLUCOSE BLDC GLUCOMTR-MCNC: 184 MG/DL — HIGH (ref 70–99)
GLUCOSE SERPL-MCNC: 146 MG/DL — HIGH (ref 70–99)
GRAM STN FLD: SIGNIFICANT CHANGE UP
HCT VFR BLD CALC: 25.8 % — LOW (ref 39–50)
HGB BLD-MCNC: 8.8 G/DL — LOW (ref 13–17)
LACTATE SERPL-SCNC: 1.8 MMOL/L — SIGNIFICANT CHANGE UP (ref 0.7–2)
MAGNESIUM SERPL-MCNC: 1.8 MG/DL — SIGNIFICANT CHANGE UP (ref 1.6–2.6)
MCHC RBC-ENTMCNC: 34.1 G/DL — SIGNIFICANT CHANGE UP (ref 32–36)
MCHC RBC-ENTMCNC: 35.8 PG — HIGH (ref 27–34)
MCV RBC AUTO: 104.9 FL — HIGH (ref 80–100)
NRBC # BLD: 1 /100 WBCS — HIGH (ref 0–0)
PHOSPHATE SERPL-MCNC: 2.3 MG/DL — LOW (ref 2.5–4.5)
PLATELET # BLD AUTO: 44 K/UL — LOW (ref 150–400)
POTASSIUM SERPL-MCNC: 3.7 MMOL/L — SIGNIFICANT CHANGE UP (ref 3.5–5.3)
POTASSIUM SERPL-SCNC: 3.7 MMOL/L — SIGNIFICANT CHANGE UP (ref 3.5–5.3)
PROT SERPL-MCNC: 5 GM/DL — LOW (ref 6–8.3)
RBC # BLD: 2.46 M/UL — LOW (ref 4.2–5.8)
RBC # FLD: 14.2 % — SIGNIFICANT CHANGE UP (ref 10.3–14.5)
SODIUM SERPL-SCNC: 145 MMOL/L — SIGNIFICANT CHANGE UP (ref 135–145)
SPECIMEN SOURCE: SIGNIFICANT CHANGE UP
WBC # BLD: 3.98 K/UL — SIGNIFICANT CHANGE UP (ref 3.8–10.5)
WBC # FLD AUTO: 3.98 K/UL — SIGNIFICANT CHANGE UP (ref 3.8–10.5)

## 2022-03-19 PROCEDURE — 99291 CRITICAL CARE FIRST HOUR: CPT

## 2022-03-19 RX ORDER — IBUPROFEN 200 MG
400 TABLET ORAL ONCE
Refills: 0 | Status: COMPLETED | OUTPATIENT
Start: 2022-03-19 | End: 2022-03-19

## 2022-03-19 RX ORDER — POTASSIUM PHOSPHATE, MONOBASIC POTASSIUM PHOSPHATE, DIBASIC 236; 224 MG/ML; MG/ML
15 INJECTION, SOLUTION INTRAVENOUS ONCE
Refills: 0 | Status: COMPLETED | OUTPATIENT
Start: 2022-03-19 | End: 2022-03-19

## 2022-03-19 RX ORDER — MAGNESIUM SULFATE 500 MG/ML
2 VIAL (ML) INJECTION ONCE
Refills: 0 | Status: COMPLETED | OUTPATIENT
Start: 2022-03-19 | End: 2022-03-19

## 2022-03-19 RX ADMIN — CHLORHEXIDINE GLUCONATE 15 MILLILITER(S): 213 SOLUTION TOPICAL at 02:07

## 2022-03-19 RX ADMIN — Medication 500 MILLIGRAM(S): at 12:19

## 2022-03-19 RX ADMIN — Medication 1 MILLIGRAM(S): at 12:30

## 2022-03-19 RX ADMIN — Medication 2 MILLIGRAM(S): at 21:12

## 2022-03-19 RX ADMIN — LEVETIRACETAM 400 MILLIGRAM(S): 250 TABLET, FILM COATED ORAL at 10:45

## 2022-03-19 RX ADMIN — Medication 25 MICROGRAM(S): at 05:34

## 2022-03-19 RX ADMIN — Medication 2 MILLIGRAM(S): at 13:45

## 2022-03-19 RX ADMIN — LEVETIRACETAM 400 MILLIGRAM(S): 250 TABLET, FILM COATED ORAL at 21:04

## 2022-03-19 RX ADMIN — MIDODRINE HYDROCHLORIDE 10 MILLIGRAM(S): 2.5 TABLET ORAL at 23:07

## 2022-03-19 RX ADMIN — SODIUM CHLORIDE 50 MILLILITER(S): 9 INJECTION, SOLUTION INTRAVENOUS at 06:33

## 2022-03-19 RX ADMIN — MIDODRINE HYDROCHLORIDE 10 MILLIGRAM(S): 2.5 TABLET ORAL at 05:34

## 2022-03-19 RX ADMIN — PIPERACILLIN AND TAZOBACTAM 200 GRAM(S): 4; .5 INJECTION, POWDER, LYOPHILIZED, FOR SOLUTION INTRAVENOUS at 21:28

## 2022-03-19 RX ADMIN — Medication 400 MILLIGRAM(S): at 02:56

## 2022-03-19 RX ADMIN — Medication 1 APPLICATION(S): at 13:46

## 2022-03-19 RX ADMIN — Medication 1 APPLICATION(S): at 05:33

## 2022-03-19 RX ADMIN — PIPERACILLIN AND TAZOBACTAM 200 GRAM(S): 4; .5 INJECTION, POWDER, LYOPHILIZED, FOR SOLUTION INTRAVENOUS at 05:34

## 2022-03-19 RX ADMIN — POTASSIUM PHOSPHATE, MONOBASIC POTASSIUM PHOSPHATE, DIBASIC 62.5 MILLIMOLE(S): 236; 224 INJECTION, SOLUTION INTRAVENOUS at 08:38

## 2022-03-19 RX ADMIN — DEXMEDETOMIDINE HYDROCHLORIDE IN 0.9% SODIUM CHLORIDE 9.56 MICROGRAM(S)/KG/HR: 4 INJECTION INTRAVENOUS at 00:37

## 2022-03-19 RX ADMIN — Medication 25 GRAM(S): at 05:35

## 2022-03-19 RX ADMIN — Medication 100 MILLIGRAM(S): at 13:45

## 2022-03-19 RX ADMIN — PIPERACILLIN AND TAZOBACTAM 200 GRAM(S): 4; .5 INJECTION, POWDER, LYOPHILIZED, FOR SOLUTION INTRAVENOUS at 13:45

## 2022-03-19 RX ADMIN — PANTOPRAZOLE SODIUM 40 MILLIGRAM(S): 20 TABLET, DELAYED RELEASE ORAL at 12:19

## 2022-03-19 RX ADMIN — Medication 25 MILLIGRAM(S): at 21:37

## 2022-03-19 RX ADMIN — MIDODRINE HYDROCHLORIDE 10 MILLIGRAM(S): 2.5 TABLET ORAL at 13:47

## 2022-03-19 RX ADMIN — Medication 1 TABLET(S): at 12:19

## 2022-03-19 RX ADMIN — LACTULOSE 10 GRAM(S): 10 SOLUTION ORAL at 17:52

## 2022-03-19 RX ADMIN — CHLORHEXIDINE GLUCONATE 1 APPLICATION(S): 213 SOLUTION TOPICAL at 11:00

## 2022-03-19 RX ADMIN — Medication 400 MILLIGRAM(S): at 05:55

## 2022-03-19 NOTE — PROGRESS NOTE ADULT - SUBJECTIVE AND OBJECTIVE BOX
Patient is a 41y old  Male who presents with a chief complaint of cardiac arrest, respiratory failure (18 Mar 2022 11:42)      Interval History: Stable and continued on 25 MCG of levothyroxine as well as blood sugar is under control and patient is not on any insulin or other diabetic medications.  HbA1c 5.0    MEDICATIONS  (STANDING):  ascorbic acid 500 milliGRAM(s) Oral daily  chlorhexidine 0.12% Liquid 15 milliLiter(s) Oral Mucosa every 12 hours  chlorhexidine 2% Cloths 1 Application(s) Topical daily  dextrose 5% + lactated ringers. 1000 milliLiter(s) (50 mL/Hr) IV Continuous <Continuous>  folic acid Injectable 1 milliGRAM(s) IV Push daily  lactulose Syrup 10 Gram(s) Oral every 12 hours  levETIRAcetam  IVPB 750 milliGRAM(s) IV Intermittent every 12 hours  levothyroxine 25 MICROGram(s) Oral daily  midodrine 10 milliGRAM(s) Oral every 8 hours  multivitamin 1 Tablet(s) Oral daily  pantoprazole  Injectable 40 milliGRAM(s) IV Push daily  petrolatum Ophthalmic Ointment 1 Application(s) Both EYES every 8 hours  piperacillin/tazobactam IVPB... 3.375 Gram(s) IV Intermittent every 8 hours  rifAXIMin 550 milliGRAM(s) Oral two times a day  thiamine Injectable 100 milliGRAM(s) IV Push daily    MEDICATIONS  (PRN):      Allergies    Allergy Status Unknown    Intolerances        REVIEW OF SYSTEMS:  CONSTITUTIONAL: no changes  EYES: No eye pain, visual disturbances, or discharge  ENMT:  No difficulty hearing, No sinus or throat pain  NECK: No pain or stiffness  RESPIRATORY: No cough, wheezing, chills or hemoptysis; No shortness of breath  CARDIOVASCULAR: No chest pain, palpitations or leg swelling  GASTROINTESTINAL: No abdominal or epigastric pain. No nausea, vomiting, or hematemesis; No diarrhea or constipation. No melena or hematochezia.  GENITOURINARY: No dysuria, frequency, hematuria, or incontinence  NEUROLOGICAL: No headaches, memory loss, loss of strength, numbness, or tremors  SKIN: No itching, burning, rashes, or lesions   ENDOCRINE: No heat or cold intolerance; No hair loss  MUSCULOSKELETAL: No joint pain or swelling; No muscle, back, or extremity pain  PSYCHIATRIC: No depression, anxiety, mood swings, or difficulty sleeping  HEME/LYMPH: No easy bruising, or bleeding gums  ALLERY AND IMMUNOLOGIC: No hives or eczema    Vital Signs Last 24 Hrs  T(C): 37.2 (19 Mar 2022 06:00), Max: 38.3 (19 Mar 2022 02:00)  T(F): 98.9 (19 Mar 2022 06:00), Max: 100.9 (19 Mar 2022 02:00)  HR: 64 (19 Mar 2022 08:18) (63 - 104)  BP: 102/68 (19 Mar 2022 08:00) (81/58 - 134/93)  BP(mean): 77 (19 Mar 2022 08:00) (64 - 103)  RR: 15 (19 Mar 2022 08:00) (0 - 20)  SpO2: 100% (19 Mar 2022 08:18) (91% - 100%)    PHYSICAL EXAM:  GENERAL:   HEAD: Atraumatic, Normocephalic  EYES: PERRLA, conjunctiva and sclera clear  ENMT: No  exudates,; Moist mucous membranes,, No lesions  NECK: Supple, No JVD, Normal thyroid  NERVOUS SYSTEM:  Alert & Oriented,   CHEST/LUNG: Clear to auscultation bilaterally; No rales, rhonchi, wheezing, or rubs  HEART: Regular rate and rhythm; No murmurs, rubs, or gallops  ABDOMEN: Soft, Nontender, Nondistended; Bowel sounds present  EXTREMITIES:  2+ Peripheral Pulses, no edema  SKIN: No rashes or lesions    LABS:        CAPILLARY BLOOD GLUCOSE      POCT Blood Glucose.: 146 mg/dL (19 Mar 2022 06:08)  POCT Blood Glucose.: 150 mg/dL (19 Mar 2022 02:03)  POCT Blood Glucose.: 168 mg/dL (18 Mar 2022 22:59)  POCT Blood Glucose.: 183 mg/dL (18 Mar 2022 17:31)  POCT Blood Glucose.: 173 mg/dL (18 Mar 2022 14:45)  POCT Blood Glucose.: 270 mg/dL (18 Mar 2022 11:08)    Lipid panel:       ABG - ( 18 Mar 2022 12:46 )  pH, Arterial: 7.42  pH, Blood: x     /  pCO2: 42    /  pO2: 110   / HCO3: 27    / Base Excess: 2.5   /  SaO2: 99.3              Mode: CPAP with PS  FiO2: 25  PEEP: 5  PS: 5  ITime: 1  MAP: 6  PIP: 10    Thyroid:  Diabetes Tests:  Parathyroid Panel:  Adrenals:  RADIOLOGY & ADDITIONAL TESTS:    Imaging Personally Reviewed:  [ ] YES  [ ] NO    Consultant(s) Notes Reviewed:  [ ] YES  [ ] NO    Care Discussed with Consultants/Other Providers [ ] YES  [ ] NO

## 2022-03-19 NOTE — PROGRESS NOTE ADULT - ATTENDING COMMENTS
40 y/o M w/history of seizure disorder, ETOH abuse, and prior craniotomy? presenting following cardiac arrest. Unclear etiology of cardiac arrest. Acute respiratory failure with hypoxia and hypercapnia in setting of arrest. Hypotension. JHONATAN likely ATN. Elevaeted liver enzymes possibly alcoholic hepatitis vs shock liver. Severe lactic acidosis - possibly due to arrest/ischemia vs seizure.    - TTM goal 36 degrees  - Sedation as needed  - Continue mechanical ventilation  - Titrate pressors as needed goal MAP >= 65  - Broad spectrum abx  - Trend Cr, avoid nephrotoxins  - Trend LFTs, avoid hepatotoxins  - TTE  - DVT prophylaxis  - Full code  - Guarded prognosis
pt seen and examined with ICU team     24 hr events:  pt weaned and extubated this morning  awake following commands but confused  weaned off levophed overnight  EEG negative for seizures  noted pt to be ?hallucinating and started to develop some tachycardia and hand tremors with restlessness    41M PMH DM diagnosed 3 months ago per family, seizure disorder, hx craniotomy (family unsure why he had craniotomy), ETOH abuse (daily drinker), pancreatitis presents after being found altered and unresponsive. Reported by EMS to be hypoglycemic to the 20s then with witnessed cardiac arrest en route. ROSC 10min with EMS. Unclear if pt had sz prior to event leading to unresponsiveness at home vs duration of hypoglycemia prior to EMS arrival. Weaned and extubated 3/19. Post extubation with confusion/disorientation, tachycardia, hand tremors concern for developing ETOH withdrawal    DX: cardiac arrest, respiratory arrest/acute respiratory failure requiring intubation, elevated LFTs, hyperbilirubinemia, hyperammonemia, hepatic encephalopathy, acute metabolic encephalopathy, seizure disorder, hypoglycemia, DM, ETOH abuse, ETOH withdrawal    Neuro  EEG negative for seizures  cont keppra for underlying seizure disorder  ammonia level down trending  cont rifaximin and lactulose for possible component of hepatic encephalopathy  monitor CIWA and placed on symptom triggered treatment with ativan as needed  today is day 3 of hospitalization and pt within withdrawal period  MVI/thiamine/folic acid supplementation for underlying ETOH use      Pulm  weaned and successfully extubated this morning  maintaining and protecting airway at present time      CVS  weaned off levophed  remains on midodrine  maintain map >65      GI  ETOH abuse  cont rifaximin and lactulose for elevated ammonia level  elevated LFTs likely from ETOh abuse and underlying liver disease but may also be from some ischemic injury with cardiac arrest  elevated t bili with noted sclera icterus  monitor lfts and t bili  supportive care for now  abdominal US without noted acute pathology    Endo  initial hypoglycemia now resolved  avoid further hypoglycemic events  blood sugar stable at present time overall < 180  has not been placed on any insulin coverage as he has not required any and is not hyperglycemic  HbA1C: 5  cont synthroid for hypothyroidism      Renal   acute renal insufficiency with ATN from cardiac arrest  Cr improving, cont to monitor  monitor uo and lytes  supplement hypophosphatemia      ID  on empiric antibiotics coverage with zosyn  will give a short course of 5 days if all cultures negative and pt clinically improving  thus far cultures are negative to date  d/c jimenez      GEN  spoke to brother at bedside  update given to family  pt remains full code  pt's pharmacy called and pt reportedly has not filled or picked up medications since 8/2021  DVT prophylaxis: bilateral compression devices due to thrombocytopenia   physical therapy

## 2022-03-19 NOTE — PROGRESS NOTE ADULT - SUBJECTIVE AND OBJECTIVE BOX
Patient is a 41y old  Male who presents with a chief complaint of cardiac arrest, respiratory failure (18 Mar 2022 11:42)      BRIEF HOSPITAL COURSE: ***  PT presented to ED 3/16 found unresponsive by family at home, called EMS found to be hypoglycemic with FS=23 by EMS, witnessed cardiac arrest obtained ROSC after approximately 20 minutes down time, no defibrillations given, Intubated PTA. While in ED found to have    Events last 24 hours: ***    PAST MEDICAL & SURGICAL HISTORY:  No pertinent past medical history        Review of Systems:  CONSTITUTIONAL: No fever, chills, or fatigue  EYES: No eye pain, visual disturbances, or discharge  ENMT:  No difficulty hearing, tinnitus, vertigo; No sinus or throat pain  NECK: No pain or stiffness  RESPIRATORY: No cough, wheezing, chills or hemoptysis; No shortness of breath  CARDIOVASCULAR: No chest pain, palpitations, dizziness, or leg swelling  GASTROINTESTINAL: No abdominal or epigastric pain. No nausea, vomiting, or hematemesis; No diarrhea or constipation. No melena or hematochezia.  GENITOURINARY: No dysuria, frequency, hematuria, or incontinence  NEUROLOGICAL: No headaches, memory loss, loss of strength, numbness, or tremors  SKIN: No itching, burning, rashes, or lesions   MUSCULOSKELETAL: No joint pain or swelling; No muscle, back, or extremity pain  PSYCHIATRIC: No depression, anxiety, mood swings, or difficulty sleeping      Medications:  piperacillin/tazobactam IVPB... 3.375 Gram(s) IV Intermittent every 8 hours  rifAXIMin 550 milliGRAM(s) Oral two times a day    midodrine 10 milliGRAM(s) Oral every 8 hours      levETIRAcetam  IVPB 750 milliGRAM(s) IV Intermittent every 12 hours        lactulose Syrup 10 Gram(s) Oral every 12 hours  pantoprazole  Injectable 40 milliGRAM(s) IV Push daily      levothyroxine 25 MICROGram(s) Oral daily    ascorbic acid 500 milliGRAM(s) Oral daily  dextrose 5% + lactated ringers. 1000 milliLiter(s) IV Continuous <Continuous>  folic acid Injectable 1 milliGRAM(s) IV Push daily  multivitamin 1 Tablet(s) Oral daily  thiamine Injectable 100 milliGRAM(s) IV Push daily      chlorhexidine 0.12% Liquid 15 milliLiter(s) Oral Mucosa every 12 hours  chlorhexidine 2% Cloths 1 Application(s) Topical daily  petrolatum Ophthalmic Ointment 1 Application(s) Both EYES every 8 hours        Mode: CPAP with PS  FiO2: 25  PEEP: 5  PS: 5  ITime: 1  MAP: 6  PIP: 10      ICU Vital Signs Last 24 Hrs  T(C): 37.2 (19 Mar 2022 06:00), Max: 38.3 (19 Mar 2022 02:00)  T(F): 98.9 (19 Mar 2022 06:00), Max: 100.9 (19 Mar 2022 02:00)  HR: 64 (19 Mar 2022 08:18) (63 - 104)  BP: 102/68 (19 Mar 2022 08:00) (81/58 - 134/93)  BP(mean): 77 (19 Mar 2022 08:00) (64 - 103)  ABP: --  ABP(mean): --  RR: 15 (19 Mar 2022 08:00) (0 - 20)  SpO2: 100% (19 Mar 2022 08:18) (91% - 100%)      ABG - ( 18 Mar 2022 12:46 )  pH, Arterial: 7.42  pH, Blood: x     /  pCO2: 42    /  pO2: 110   / HCO3: 27    / Base Excess: 2.5   /  SaO2: 99.3                I&O's Detail    18 Mar 2022 07:01  -  19 Mar 2022 07:00  --------------------------------------------------------  IN:    Dexmedetomidine: 139.9 mL    dextrose 5% + lactated ringers: 1350 mL    FentaNYL: 109 mL    IV PiggyBack: 1650 mL    Norepinephrine: 34.7 mL  Total IN: 3283.6 mL    OUT:    Indwelling Catheter - Urethral (mL): 810 mL  Total OUT: 810 mL    Total NET: 2473.6 mL      19 Mar 2022 07:01  -  19 Mar 2022 09:31  --------------------------------------------------------  IN:    dextrose 5% + lactated ringers: 50 mL    IV PiggyBack: 250 mL  Total IN: 300 mL    OUT:    Indwelling Catheter - Urethral (mL): 45 mL  Total OUT: 45 mL    Total NET: 255 mL            LABS:                        8.8    3.98  )-----------( 44       ( 19 Mar 2022 04:27 )             25.8     03-19    145  |  111<H>  |  38<H>  ----------------------------<  146<H>  3.7   |  26  |  1.42<H>    Ca    7.6<L>      19 Mar 2022 04:27  Phos  2.3     03-19  Mg     1.8     03-19    TPro  5.0<L>  /  Alb  2.8<L>  /  TBili  5.8<H>  /  DBili  x   /  AST  710<H>  /  ALT  155<H>  /  AlkPhos  120  03-19          CAPILLARY BLOOD GLUCOSE      POCT Blood Glucose.: 146 mg/dL (19 Mar 2022 06:08)        CULTURES:  Culture Results:   Normal Respiratory Guera present (03-17 @ 16:31)  Culture Results:   No growth (03-17 @ 09:45)  Culture Results:   No growth to date. (03-17 @ 09:25)  Culture Results:   No growth to date. (03-17 @ 09:22)      Physical Examination:    General: No acute distress.  Alert, oriented, interactive, nonfocal    HEENT: Pupils equal, reactive to light.  Symmetric.    PULM: Clear to auscultation bilaterally, no significant sputum production    CVS: Regular rate and rhythm, no murmurs, rubs, or gallops    ABD: Soft, nondistended, nontender, normoactive bowel sounds, no masses    EXT: No edema, nontender    SKIN: Warm and well perfused, no rashes noted.    RADIOLOGY: ***    CRITICAL CARE TIME SPENT: ***   Patient is a 41y old  Male who presents with a chief complaint of cardiac arrest, respiratory failure (18 Mar 2022 11:42)      BRIEF HOSPITAL COURSE: ***  PT presented to ED 3/16 found unresponsive by family at home, called EMS found to be hypoglycemic with FS=23 by EMS, witnessed cardiac arrest obtained ROSC after approximately 20 minutes down time, no defibrillations given, Intubated PTA. While in ED found to have JHONATAN, Lactic/metabolic acidosis, shock liver.    Events last 24 hours: ***  Successful SBT & extubated today 3/19. Off sedation and vasopressors x 1 day. Alert, somewhat disoriented but following commands. Denies any pain.     PAST MEDICAL & SURGICAL HISTORY:  No pertinent past medical history        Review of Systems:  CONSTITUTIONAL: No fever, chills, or fatigue  EYES: No eye pain, visual disturbances, or discharge  ENMT:  No difficulty hearing, tinnitus, vertigo; No sinus or throat pain  NECK: No pain or stiffness  RESPIRATORY: No cough, wheezing, chills or hemoptysis; No shortness of breath  CARDIOVASCULAR: No chest pain, palpitations, dizziness, or leg swelling  GASTROINTESTINAL: No abdominal or epigastric pain. No nausea, vomiting, or hematemesis; No diarrhea or constipation. No melena or hematochezia.  GENITOURINARY: No dysuria, frequency, hematuria, or incontinence  NEUROLOGICAL: No headaches, memory loss, loss of strength, numbness, or tremors  SKIN: No itching, burning, rashes, or lesions   MUSCULOSKELETAL: No joint pain or swelling; No muscle, back, or extremity pain  PSYCHIATRIC: No depression, anxiety, mood swings, or difficulty sleeping            ICU Vital Signs Last 24 Hrs  T(C): 37.2 (19 Mar 2022 06:00), Max: 38.3 (19 Mar 2022 02:00)  T(F): 98.9 (19 Mar 2022 06:00), Max: 100.9 (19 Mar 2022 02:00)  HR: 64 (19 Mar 2022 08:18) (63 - 104)  BP: 102/68 (19 Mar 2022 08:00) (81/58 - 134/93)  BP(mean): 77 (19 Mar 2022 08:00) (64 - 103)  ABP: --  ABP(mean): --  RR: 15 (19 Mar 2022 08:00) (0 - 20)  SpO2: 100% (19 Mar 2022 08:18) (91% - 100%)      PHYSICAL EXAM:  General: Laying in bed, appears comfortable NAD  HEENT: PERRL, scleral icterus, oral mucosa dry  Resp: CTA B/L, no adventitious sounds, no labored respirations.   CVS: S1/S2, RRR, no m/r/g  Abd: soft ND, NTTP, +BS  Ext: no c/c/e  Neuro: A&Ox2 (person and place),   Psych: possible visual hallucinations      LABS:                        8.8    3.98  )-----------( 44       ( 19 Mar 2022 04:27 )             25.8     03-19    145  |  111<H>  |  38<H>  ----------------------------<  146<H>  3.7   |  26  |  1.42<H>    Ca    7.6<L>      19 Mar 2022 04:27  Phos  2.3     03-19  Mg     1.8     03-19    TPro  5.0<L>  /  Alb  2.8<L>  /  TBili  5.8<H>  /  DBili  x   /  AST  710<H>  /  ALT  155<H>  /  AlkPhos  120  03-19    ABG - ( 18 Mar 2022 12:46 )  pH, Arterial: 7.42  pH, Blood: x     /  pCO2: 42    /  pO2: 110   / HCO3: 27    / Base Excess: 2.5   /  SaO2: 99.3        Ammonia, Serum: 17 umol/L    CULTURES:  Culture Results:   Normal Respiratory Guera present (03-17 @ 16:31)  Culture Results:   No growth (03-17 @ 09:45)  Culture Results:   No growth to date. (03-17 @ 09:25)  Culture Results:   No growth to date. (03-17 @ 09:22)    CAPILLARY BLOOD GLUCOSE      POCT Blood Glucose.: 168 mg/dL (19 Mar 2022 11:50)  POCT Blood Glucose.: 146 mg/dL (19 Mar 2022 06:08)  POCT Blood Glucose.: 150 mg/dL (19 Mar 2022 02:03)  POCT Blood Glucose.: 168 mg/dL (18 Mar 2022 22:59)  POCT Blood Glucose.: 183 mg/dL (18 Mar 2022 17:31)  POCT Blood Glucose.: 173 mg/dL (18 Mar 2022 14:45)      I&O's Detail    18 Mar 2022 07:01  -  19 Mar 2022 07:00  --------------------------------------------------------  IN:    Dexmedetomidine: 139.9 mL    dextrose 5% + lactated ringers: 1350 mL    FentaNYL: 109 mL    IV PiggyBack: 1650 mL    Norepinephrine: 34.7 mL  Total IN: 3283.6 mL    OUT:    Indwelling Catheter - Urethral (mL): 810 mL  Total OUT: 810 mL    Total NET: 2473.6 mL      19 Mar 2022 07:01  -  19 Mar 2022 09:31  --------------------------------------------------------  IN:    dextrose 5% + lactated ringers: 50 mL    IV PiggyBack: 250 mL  Total IN: 300 mL    OUT:    Indwelling Catheter - Urethral (mL): 45 mL  Total OUT: 45 mL    Total NET: 255 mL        RADIOLOGY: ***    < from: US Abdomen Complete (US Abdomen Complete .) (03.17.22 @ 10:35) >    IMPRESSION:    Enlarged liver.  Coarse and heterogeneous echotexture may reflect hepatic steatosis or   hepatocellular disease.        --- End of Report ---    < end of copied text >            Medications:  piperacillin/tazobactam IVPB... 3.375 Gram(s) IV Intermittent every 8 hours  rifAXIMin 550 milliGRAM(s) Oral two times a day    midodrine 10 milliGRAM(s) Oral every 8 hours      levETIRAcetam  IVPB 750 milliGRAM(s) IV Intermittent every 12 hours        lactulose Syrup 10 Gram(s) Oral every 12 hours  pantoprazole  Injectable 40 milliGRAM(s) IV Push daily      levothyroxine 25 MICROGram(s) Oral daily    ascorbic acid 500 milliGRAM(s) Oral daily  dextrose 5% + lactated ringers. 1000 milliLiter(s) IV Continuous <Continuous>  folic acid Injectable 1 milliGRAM(s) IV Push daily  multivitamin 1 Tablet(s) Oral daily  thiamine Injectable 100 milliGRAM(s) IV Push daily      chlorhexidine 0.12% Liquid 15 milliLiter(s) Oral Mucosa every 12 hours  chlorhexidine 2% Cloths 1 Application(s) Topical daily  petrolatum Ophthalmic Ointment 1 Application(s) Both EYES every 8 hours Patient is a 41y old  Male who presents with a chief complaint of cardiac arrest, respiratory failure (18 Mar 2022 11:42)      BRIEF HOSPITAL COURSE: ***  PT presented to ED 3/16 found unresponsive by family at home, called EMS found to be hypoglycemic with FS=23 by EMS, witnessed cardiac arrest obtained ROSC after approximately 20 minutes down time, no defibrillations given, Intubated PTA. While in ED found to have JHONATAN, Lactic/metabolic acidosis, shock liver.    Events last 24 hours: ***  Successful SBT & extubated today 3/19. Off sedation and vasopressors x 1 day. Alert, somewhat disoriented but following commands. Denies any pain, SOB, nausea.      PAST MEDICAL & SURGICAL HISTORY:  No pertinent past medical history        Review of Systems: All negative except for HPI.            ICU Vital Signs Last 24 Hrs  T(C): 37.2 (19 Mar 2022 06:00), Max: 38.3 (19 Mar 2022 02:00)  T(F): 98.9 (19 Mar 2022 06:00), Max: 100.9 (19 Mar 2022 02:00)  HR: 64 (19 Mar 2022 08:18) (63 - 104)  BP: 102/68 (19 Mar 2022 08:00) (81/58 - 134/93)  BP(mean): 77 (19 Mar 2022 08:00) (64 - 103)  ABP: --  ABP(mean): --  RR: 15 (19 Mar 2022 08:00) (0 - 20)  SpO2: 100% (19 Mar 2022 08:18) (91% - 100%)      PHYSICAL EXAM:  General: Laying in bed, appears comfortable NAD  HEENT: PERRL, scleral icterus, oral mucosa dry  Resp: CTA B/L, no adventitious sounds, no labored respirations.   CVS: S1/S2, RRR, no m/r/g  Abd: soft ND, NTTP, +BS  Ext: no c/c/e, moving all extremities freely  Neuro: A&Ox2 (person and place),   Psych: possible visual hallucinations      LABS:                        8.8    3.98  )-----------( 44       ( 19 Mar 2022 04:27 )             25.8     03-19    145  |  111<H>  |  38<H>  ----------------------------<  146<H>  3.7   |  26  |  1.42<H>    Ca    7.6<L>      19 Mar 2022 04:27  Phos  2.3     03-19  Mg     1.8     03-19    TPro  5.0<L>  /  Alb  2.8<L>  /  TBili  5.8<H>  /  DBili  x   /  AST  710<H>  /  ALT  155<H>  /  AlkPhos  120  03-19    ABG - ( 18 Mar 2022 12:46 )  pH, Arterial: 7.42  pH, Blood: x     /  pCO2: 42    /  pO2: 110   / HCO3: 27    / Base Excess: 2.5   /  SaO2: 99.3        Ammonia, Serum: 17 umol/L    CULTURES:  Culture Results:   Normal Respiratory Guera present (03-17 @ 16:31)  Culture Results:   No growth (03-17 @ 09:45)  Culture Results:   No growth to date. (03-17 @ 09:25)  Culture Results:   No growth to date. (03-17 @ 09:22)    CAPILLARY BLOOD GLUCOSE      POCT Blood Glucose.: 168 mg/dL (19 Mar 2022 11:50)  POCT Blood Glucose.: 146 mg/dL (19 Mar 2022 06:08)  POCT Blood Glucose.: 150 mg/dL (19 Mar 2022 02:03)  POCT Blood Glucose.: 168 mg/dL (18 Mar 2022 22:59)  POCT Blood Glucose.: 183 mg/dL (18 Mar 2022 17:31)  POCT Blood Glucose.: 173 mg/dL (18 Mar 2022 14:45)      I&O's Detail    18 Mar 2022 07:01  -  19 Mar 2022 07:00  --------------------------------------------------------  IN:    Dexmedetomidine: 139.9 mL    dextrose 5% + lactated ringers: 1350 mL    FentaNYL: 109 mL    IV PiggyBack: 1650 mL    Norepinephrine: 34.7 mL  Total IN: 3283.6 mL    OUT:    Indwelling Catheter - Urethral (mL): 810 mL  Total OUT: 810 mL    Total NET: 2473.6 mL      19 Mar 2022 07:01  -  19 Mar 2022 09:31  --------------------------------------------------------  IN:    dextrose 5% + lactated ringers: 50 mL    IV PiggyBack: 250 mL  Total IN: 300 mL    OUT:    Indwelling Catheter - Urethral (mL): 45 mL  Total OUT: 45 mL    Total NET: 255 mL        RADIOLOGY: ***    < from: US Abdomen Complete (US Abdomen Complete .) (03.17.22 @ 10:35) >    IMPRESSION:    Enlarged liver.  Coarse and heterogeneous echotexture may reflect hepatic steatosis or   hepatocellular disease.        --- End of Report ---    < end of copied text >            Medications:  piperacillin/tazobactam IVPB... 3.375 Gram(s) IV Intermittent every 8 hours  rifAXIMin 550 milliGRAM(s) Oral two times a day    midodrine 10 milliGRAM(s) Oral every 8 hours      levETIRAcetam  IVPB 750 milliGRAM(s) IV Intermittent every 12 hours        lactulose Syrup 10 Gram(s) Oral every 12 hours  pantoprazole  Injectable 40 milliGRAM(s) IV Push daily      levothyroxine 25 MICROGram(s) Oral daily    ascorbic acid 500 milliGRAM(s) Oral daily  dextrose 5% + lactated ringers. 1000 milliLiter(s) IV Continuous <Continuous>  folic acid Injectable 1 milliGRAM(s) IV Push daily  multivitamin 1 Tablet(s) Oral daily  thiamine Injectable 100 milliGRAM(s) IV Push daily      chlorhexidine 0.12% Liquid 15 milliLiter(s) Oral Mucosa every 12 hours  chlorhexidine 2% Cloths 1 Application(s) Topical daily  petrolatum Ophthalmic Ointment 1 Application(s) Both EYES every 8 hours

## 2022-03-19 NOTE — PROGRESS NOTE ADULT - ASSESSMENT
41M PMH DM dx 3 months ago per family, seizure disorder, hx craniotomy (family at bedside unsure why he had craniotomy), ETOH abuse (daily drinker), pancreatitis denies hx of ETOH withdrawal by family presents after being found unresponsive. Reported by EMS to be hypoglycemic to the 20s then with witnessed cardiac arrest ROSC 10min with EMS. Unclear if pt had sz prior to event leading to unresponsiveness vs duration of hypoglycemia prior to EMS arrival.     DX: cardiac arrest, respiratory arrest/acute respiratory failure requiring intubation, elevated LFTs, hyperbilirubinemia, seizure disorder, hypoglycemia, DM, ETOH abuse    Neuro  wean off sedation to assess mental status  follow up eeg  cont keppra for seizure disoder  ammonia level down trending  cont rifaximin and lactulose for possible component of hepatic encephalopathy      Pulm  cont vent support  maintain sat>90%   start daily weaning as tolerates    CVS  weaned off levophed  maintain map >65  hypotensive today will reinitiate levo and start midodrine for BP support    GI  ETOH abuse  cont rifaximin and lactulose for elevated ammonia level  elevated LFTs likely from ETOh abuse and liver disease but may also be from some ischemic injury with cardiac arrest  elevated t bili with noted sclera icterus  monitor lfts and t bili  supportive care for now    Endo  initial hypoglycemia  avoid further hypoglycemic events  blood sugar stable at present time      Renal   acute renal insufficiency with ATN from cardiac arrest  monitor uo and lytes  supplement hypophosphatemia and hypokalemia  monitor Cr    ID  on empiric antibiotics coverage with zosyn  cultures negative to date    GEN  spoke to wife and brother at bedside  update given to family  they were made aware that prognosis is guarded and awaiting to see neurologic recovery post arrest  at present time pt remains full code  DVT prophylaxis: bilateral compression devices  echo with normal LV function  41M with PHX: DM dx 3 months ago per family, seizure disorder, hx craniotomy (family at bedside unsure why he had craniotomy), ETOH abuse (daily drinker), pancreatitis denies hx of ETOH withdrawal by family presents after being found unresponsive. Reported by EMS to be hypoglycemic to the 20s then with witnessed cardiac arrest ROSC 20 min with EMS. Unclear is experienced seizure activity prior to event leading to unresponsiveness vs duration of hypoglycemia prior to EMS arrival.     DX: cardiac arrest, respiratory arrest/acute respiratory failure requiring intubation, elevated LFTs, hyperbilirubinemia, seizure disorder, hypoglycemia, DM, ETOH abuse    #Neuro:  -Off sedation x 1 day alert, following commands but somewhat disoriented and possible visual hallucinations likely due to combination of anoxic & hepatic encephelopathy vs ETOH withdrawal. Ammonia level down trending, C/W lactulose and Rifaximin.  -Initiated symptom triggered CIWA for ETOH withdrawal component  -EEG with no seizure activity  -Cont keppra for seizure disorder  -Monitor neuro status    #Pulm  -Succsessful SBT & extubation today.  - Currently on room air with SpO2 > 95%.  - Maintain SpO2>90%    #CV  - Weaned off levophed  - Midodrine 10 mg TID to maintain map >65    #GI  -ETOH abuse: cont rifaximin and lactulose for elevated ammonia level  -Elevated LFTs likely from ETOh abuse and liver disease but may also be from some ischemic injury with cardiac arrest  -elevated t bili with noted sclera icterus  - ABD US  -monitor lfts and t bili  -supportive care for now    Endo  initial hypoglycemia  avoid further hypoglycemic events  blood sugar stable at present time      Renal   acute renal insufficiency with ATN from cardiac arrest  monitor uo and lytes  supplement hypophosphatemia and hypokalemia  monitor Cr    ID  on empiric antibiotics coverage with zosyn  cultures negative to date    GEN  spoke to wife and brother at bedside  update given to family  they were made aware that prognosis is guarded and awaiting to see neurologic recovery post arrest  at present time pt remains full code  DVT prophylaxis: bilateral compression devices  echo with normal LV function  41M with PHX: DM dx 3 months ago per family, seizure disorder, hx craniotomy (family at bedside unsure why he had craniotomy), ETOH abuse (daily drinker), pancreatitis denies hx of ETOH withdrawal by family presents after being found unresponsive. Reported by EMS to be hypoglycemic to the 20s then with witnessed cardiac arrest ROSC 20 min with EMS. Unclear is experienced seizure activity prior to event leading to unresponsiveness vs duration of hypoglycemia prior to EMS arrival.     DX: cardiac arrest, respiratory arrest/acute respiratory failure requiring intubation, elevated LFTs, hyperbilirubinemia, seizure disorder, hypoglycemia, DM, ETOH abuse    #Neuro:  -Off sedation x 1 day alert, following commands but somewhat disoriented and possible visual hallucinations likely due to combination of anoxic & hepatic encephelopathy vs ETOH withdrawal. Ammonia level down trending, C/W lactulose and Rifaximin.  -Initiated symptom triggered CIWA for ETOH withdrawal component  -EEG with no seizure activity  -Cont keppra for seizure disorder  -Multivitamin, folic acid, thiamine  -Monitor neuro status    #Pulm  -Succsessful SBT & extubation today.  - Currently on room air with SpO2 > 95%.  - Maintain SpO2>90%    #CV  - Weaned off levophed  - Midodrine 10 mg TID to maintain map >65    #GI  -ETOH abuse: cont rifaximin and lactulose for elevated ammonia level  -Elevated LFTs likely from ETOh abuse and liver disease but may also be from some ischemic injury with cardiac arrest  -elevated t bili with noted sclera icterus  - ABD US with hepatocellular disease likely from ETOH use  -monitor lfts and t bili  -supportive care for now  -Dysphagia screen and advance diet as tolerated    #Endo  -initial hypoglycemia, on D5/LR to avoid further hypoglycemic events. FS stable presently.   - monitor FS  - Levothyroxine for hypothyroidism; Endo reccs noted with goal TSH 2.0.       #Renal   - Acute renal insufficiency with ATN from cardiac arrest  -Monitor UO and lytes, D/C indwelling urinary catheter.   -hypophosphatemia, hypomagnesemia and hypokalemia supplemented overnight, supplement as needed  -monitor Cr    #ID  -On empiric antibiotics coverage with zosyn  - Cultures negative to date, follow up final    #HEME  -Thrombocytopenic, likely due to hepatocellular disease, avoid chemical AC at this time  -DVT ppx with SCD's    #GEN  -Spoke with brother at bedside, update provided.  -they were made aware that prognosis is guarded and awaiting to see neurologic recovery post arrest  -at present time pt remains full code  - PT consult  -echo with normal LV function

## 2022-03-20 LAB
ALBUMIN SERPL ELPH-MCNC: 2.8 G/DL — LOW (ref 3.3–5)
ALBUMIN SERPL ELPH-MCNC: 3 G/DL — LOW (ref 3.3–5)
ALP SERPL-CCNC: 173 U/L — HIGH (ref 40–120)
ALP SERPL-CCNC: 226 U/L — HIGH (ref 40–120)
ALT FLD-CCNC: 170 U/L — HIGH (ref 12–78)
ALT FLD-CCNC: 194 U/L — HIGH (ref 12–78)
ANION GAP SERPL CALC-SCNC: 8 MMOL/L — SIGNIFICANT CHANGE UP (ref 5–17)
ANION GAP SERPL CALC-SCNC: 9 MMOL/L — SIGNIFICANT CHANGE UP (ref 5–17)
AST SERPL-CCNC: 492 U/L — HIGH (ref 15–37)
AST SERPL-CCNC: 502 U/L — HIGH (ref 15–37)
BILIRUB SERPL-MCNC: 6.5 MG/DL — HIGH (ref 0.2–1.2)
BILIRUB SERPL-MCNC: 7.6 MG/DL — HIGH (ref 0.2–1.2)
BUN SERPL-MCNC: 21 MG/DL — SIGNIFICANT CHANGE UP (ref 7–23)
BUN SERPL-MCNC: 27 MG/DL — HIGH (ref 7–23)
CALCIUM SERPL-MCNC: 8.1 MG/DL — LOW (ref 8.5–10.1)
CALCIUM SERPL-MCNC: 8.4 MG/DL — LOW (ref 8.5–10.1)
CHLORIDE SERPL-SCNC: 111 MMOL/L — HIGH (ref 96–108)
CHLORIDE SERPL-SCNC: 113 MMOL/L — HIGH (ref 96–108)
CO2 SERPL-SCNC: 27 MMOL/L — SIGNIFICANT CHANGE UP (ref 22–31)
CO2 SERPL-SCNC: 28 MMOL/L — SIGNIFICANT CHANGE UP (ref 22–31)
CREAT SERPL-MCNC: 0.94 MG/DL — SIGNIFICANT CHANGE UP (ref 0.5–1.3)
CREAT SERPL-MCNC: 1 MG/DL — SIGNIFICANT CHANGE UP (ref 0.5–1.3)
EGFR: 104 ML/MIN/1.73M2 — SIGNIFICANT CHANGE UP
EGFR: 97 ML/MIN/1.73M2 — SIGNIFICANT CHANGE UP
GLUCOSE BLDC GLUCOMTR-MCNC: 134 MG/DL — HIGH (ref 70–99)
GLUCOSE BLDC GLUCOMTR-MCNC: 142 MG/DL — HIGH (ref 70–99)
GLUCOSE BLDC GLUCOMTR-MCNC: 184 MG/DL — HIGH (ref 70–99)
GLUCOSE BLDC GLUCOMTR-MCNC: 204 MG/DL — HIGH (ref 70–99)
GLUCOSE SERPL-MCNC: 142 MG/DL — HIGH (ref 70–99)
GLUCOSE SERPL-MCNC: 147 MG/DL — HIGH (ref 70–99)
HCT VFR BLD CALC: 24.1 % — LOW (ref 39–50)
HGB BLD-MCNC: 8.2 G/DL — LOW (ref 13–17)
MAGNESIUM SERPL-MCNC: 2.2 MG/DL — SIGNIFICANT CHANGE UP (ref 1.6–2.6)
MAGNESIUM SERPL-MCNC: 2.5 MG/DL — SIGNIFICANT CHANGE UP (ref 1.6–2.6)
MCHC RBC-ENTMCNC: 34 G/DL — SIGNIFICANT CHANGE UP (ref 32–36)
MCHC RBC-ENTMCNC: 35.5 PG — HIGH (ref 27–34)
MCV RBC AUTO: 104.3 FL — HIGH (ref 80–100)
NRBC # BLD: 0 /100 WBCS — SIGNIFICANT CHANGE UP (ref 0–0)
PHOSPHATE SERPL-MCNC: 0.9 MG/DL — CRITICAL LOW (ref 2.5–4.5)
PHOSPHATE SERPL-MCNC: 1.6 MG/DL — LOW (ref 2.5–4.5)
PLATELET # BLD AUTO: 62 K/UL — LOW (ref 150–400)
POTASSIUM SERPL-MCNC: 3.3 MMOL/L — LOW (ref 3.5–5.3)
POTASSIUM SERPL-MCNC: 3.7 MMOL/L — SIGNIFICANT CHANGE UP (ref 3.5–5.3)
POTASSIUM SERPL-SCNC: 3.3 MMOL/L — LOW (ref 3.5–5.3)
POTASSIUM SERPL-SCNC: 3.7 MMOL/L — SIGNIFICANT CHANGE UP (ref 3.5–5.3)
PROT SERPL-MCNC: 5.1 GM/DL — LOW (ref 6–8.3)
PROT SERPL-MCNC: 5.8 GM/DL — LOW (ref 6–8.3)
RBC # BLD: 2.31 M/UL — LOW (ref 4.2–5.8)
RBC # FLD: 14.6 % — HIGH (ref 10.3–14.5)
SODIUM SERPL-SCNC: 147 MMOL/L — HIGH (ref 135–145)
SODIUM SERPL-SCNC: 149 MMOL/L — HIGH (ref 135–145)
WBC # BLD: 6.97 K/UL — SIGNIFICANT CHANGE UP (ref 3.8–10.5)
WBC # FLD AUTO: 6.97 K/UL — SIGNIFICANT CHANGE UP (ref 3.8–10.5)

## 2022-03-20 PROCEDURE — 99233 SBSQ HOSP IP/OBS HIGH 50: CPT

## 2022-03-20 RX ORDER — PHENOBARBITAL 60 MG
65 TABLET ORAL EVERY 8 HOURS
Refills: 0 | Status: DISCONTINUED | OUTPATIENT
Start: 2022-03-21 | End: 2022-03-22

## 2022-03-20 RX ORDER — THIAMINE MONONITRATE (VIT B1) 100 MG
100 TABLET ORAL DAILY
Refills: 0 | Status: DISCONTINUED | OUTPATIENT
Start: 2022-03-20 | End: 2022-03-25

## 2022-03-20 RX ORDER — PHENOBARBITAL 60 MG
65 TABLET ORAL EVERY 12 HOURS
Refills: 0 | Status: DISCONTINUED | OUTPATIENT
Start: 2022-03-22 | End: 2022-03-23

## 2022-03-20 RX ORDER — POTASSIUM PHOSPHATE, MONOBASIC POTASSIUM PHOSPHATE, DIBASIC 236; 224 MG/ML; MG/ML
30 INJECTION, SOLUTION INTRAVENOUS ONCE
Refills: 0 | Status: COMPLETED | OUTPATIENT
Start: 2022-03-20 | End: 2022-03-20

## 2022-03-20 RX ORDER — PHENOBARBITAL 60 MG
65 TABLET ORAL DAILY
Refills: 0 | Status: DISCONTINUED | OUTPATIENT
Start: 2022-03-23 | End: 2022-03-23

## 2022-03-20 RX ORDER — PHENOBARBITAL 60 MG
65 TABLET ORAL EVERY 6 HOURS
Refills: 0 | Status: DISCONTINUED | OUTPATIENT
Start: 2022-03-20 | End: 2022-03-21

## 2022-03-20 RX ORDER — PHENOBARBITAL 60 MG
TABLET ORAL
Refills: 0 | Status: DISCONTINUED | OUTPATIENT
Start: 2022-03-20 | End: 2022-03-20

## 2022-03-20 RX ORDER — PHENOBARBITAL 60 MG
65 TABLET ORAL
Refills: 0 | Status: DISCONTINUED | OUTPATIENT
Start: 2022-03-20 | End: 2022-03-23

## 2022-03-20 RX ORDER — FOLIC ACID 0.8 MG
1 TABLET ORAL DAILY
Refills: 0 | Status: DISCONTINUED | OUTPATIENT
Start: 2022-03-20 | End: 2022-03-25

## 2022-03-20 RX ORDER — PHENOBARBITAL 60 MG
TABLET ORAL
Refills: 0 | Status: DISCONTINUED | OUTPATIENT
Start: 2022-03-20 | End: 2022-03-23

## 2022-03-20 RX ORDER — PHENOBARBITAL 60 MG
64.8 TABLET ORAL EVERY 6 HOURS
Refills: 0 | Status: DISCONTINUED | OUTPATIENT
Start: 2022-03-20 | End: 2022-03-20

## 2022-03-20 RX ORDER — PANTOPRAZOLE SODIUM 20 MG/1
40 TABLET, DELAYED RELEASE ORAL
Refills: 0 | Status: DISCONTINUED | OUTPATIENT
Start: 2022-03-21 | End: 2022-03-25

## 2022-03-20 RX ORDER — PHENOBARBITAL 60 MG
130 TABLET ORAL ONCE
Refills: 0 | Status: DISCONTINUED | OUTPATIENT
Start: 2022-03-20 | End: 2022-03-20

## 2022-03-20 RX ORDER — POTASSIUM CHLORIDE 20 MEQ
40 PACKET (EA) ORAL ONCE
Refills: 0 | Status: COMPLETED | OUTPATIENT
Start: 2022-03-20 | End: 2022-03-20

## 2022-03-20 RX ORDER — PHENOBARBITAL 60 MG
32.4 TABLET ORAL EVERY 8 HOURS
Refills: 0 | Status: DISCONTINUED | OUTPATIENT
Start: 2022-03-20 | End: 2022-03-20

## 2022-03-20 RX ORDER — SODIUM CHLORIDE 9 MG/ML
1000 INJECTION, SOLUTION INTRAVENOUS
Refills: 0 | Status: DISCONTINUED | OUTPATIENT
Start: 2022-03-20 | End: 2022-03-21

## 2022-03-20 RX ADMIN — LEVETIRACETAM 400 MILLIGRAM(S): 250 TABLET, FILM COATED ORAL at 21:36

## 2022-03-20 RX ADMIN — LEVETIRACETAM 400 MILLIGRAM(S): 250 TABLET, FILM COATED ORAL at 09:35

## 2022-03-20 RX ADMIN — Medication 100 MILLIGRAM(S): at 11:31

## 2022-03-20 RX ADMIN — Medication 65 MILLIGRAM(S): at 11:12

## 2022-03-20 RX ADMIN — SODIUM CHLORIDE 50 MILLILITER(S): 9 INJECTION, SOLUTION INTRAVENOUS at 21:05

## 2022-03-20 RX ADMIN — Medication 65 MILLIGRAM(S): at 23:29

## 2022-03-20 RX ADMIN — MIDODRINE HYDROCHLORIDE 10 MILLIGRAM(S): 2.5 TABLET ORAL at 06:04

## 2022-03-20 RX ADMIN — Medication 2 MILLIGRAM(S): at 08:21

## 2022-03-20 RX ADMIN — LACTULOSE 10 GRAM(S): 10 SOLUTION ORAL at 05:00

## 2022-03-20 RX ADMIN — CHLORHEXIDINE GLUCONATE 1 APPLICATION(S): 213 SOLUTION TOPICAL at 11:12

## 2022-03-20 RX ADMIN — Medication 65 MILLIGRAM(S): at 19:38

## 2022-03-20 RX ADMIN — Medication 65 MILLIGRAM(S): at 18:35

## 2022-03-20 RX ADMIN — Medication 1 MILLIGRAM(S): at 11:31

## 2022-03-20 RX ADMIN — PIPERACILLIN AND TAZOBACTAM 200 GRAM(S): 4; .5 INJECTION, POWDER, LYOPHILIZED, FOR SOLUTION INTRAVENOUS at 21:36

## 2022-03-20 RX ADMIN — Medication 65 MILLIGRAM(S): at 14:47

## 2022-03-20 RX ADMIN — POTASSIUM PHOSPHATE, MONOBASIC POTASSIUM PHOSPHATE, DIBASIC 83.33 MILLIMOLE(S): 236; 224 INJECTION, SOLUTION INTRAVENOUS at 14:22

## 2022-03-20 RX ADMIN — PIPERACILLIN AND TAZOBACTAM 200 GRAM(S): 4; .5 INJECTION, POWDER, LYOPHILIZED, FOR SOLUTION INTRAVENOUS at 05:01

## 2022-03-20 RX ADMIN — POTASSIUM PHOSPHATE, MONOBASIC POTASSIUM PHOSPHATE, DIBASIC 83.33 MILLIMOLE(S): 236; 224 INJECTION, SOLUTION INTRAVENOUS at 03:40

## 2022-03-20 RX ADMIN — Medication 32.4 MILLIGRAM(S): at 05:01

## 2022-03-20 RX ADMIN — Medication 130 MILLIGRAM(S): at 16:31

## 2022-03-20 RX ADMIN — Medication 25 MICROGRAM(S): at 05:00

## 2022-03-20 RX ADMIN — PIPERACILLIN AND TAZOBACTAM 200 GRAM(S): 4; .5 INJECTION, POWDER, LYOPHILIZED, FOR SOLUTION INTRAVENOUS at 13:22

## 2022-03-20 RX ADMIN — Medication 40 MILLIEQUIVALENT(S): at 03:04

## 2022-03-20 RX ADMIN — Medication 1 TABLET(S): at 11:12

## 2022-03-20 RX ADMIN — Medication 500 MILLIGRAM(S): at 11:12

## 2022-03-20 NOTE — CHART NOTE - NSCHARTNOTEFT_GEN_A_CORE
Chart reviewed and noted events to date. 3/16 found by wife supine in bed, incomprehensible speech. EMS called. Found c finger stick of 23. On transfer to [facility], noted to be going into cardiac arrest. x1 Epinephrine given. ROSC 10 minutes. No shocks given. Intubated on scene. In ED, in sinus tachycarida, hyperglycemia, hypotensed at sBP 70s. Levophed initiated. End tidal CO2 ~30.   Extubated on 3/19. Now on CIWA 5, RASS +1. Required phenobarbital due to agitation and confusion. Per RN Eddie &Per RN, being observed for new DTs. Deferred PT assessment at this time. Will continue to follow and assess when more responsive. Chart reviewed and noted events to date. 3/16 found by wife supine in bed, incomprehensible speech. EMS called. Found c finger stick of 23. On transfer to [facility], noted to be going into cardiac arrest. x1 Epinephrine given. ROSC 10 minutes. No shocks given. Intubated on scene. In ED, in sinus tachycarida, hyperglycemia, hypotensed at sBP 70s. Levophed initiated. End tidal CO2 ~30.   Extubated on 3/19. Now on CIWA 5, RASS +1. Required ativan + phenobarbital due to agitation and confusion. Per LUCRETIA Morgan &Per RN, being observed for new DTs. Deferred PT assessment at this time. Will continue to follow and assess when more responsive. Chart reviewed and noted events to date. 3/16 found by wife supine in bed, incomprehensible speech. EMS called. Found c finger stick of 23. On transfer to [facility], noted to be going into cardiac arrest. x1 Epinephrine given. ROSC 10 minutes. No shocks given. Intubated on scene. In ED, in sinus tachycarida, hyperglycemia, hypotensed at sBP 70s. Levophed initiated. End tidal CO2 ~30.   Extubated on 3/19. Now on CIWA 5, RASS +1. Required ativan + phenobarbital due to agitation and confusion. On attempt to rouse by voice, touch and sternal rubbing--no workable response. Per RN Eddie & Per RN Susana, being observed for new DTs. Deferred PT assessment at this time. Will continue to follow and assess when more responsive.

## 2022-03-20 NOTE — PROGRESS NOTE ADULT - PROBLEM SELECTOR PLAN 2
HbA1c 5.3.  Does not require and will not require any diabetic medications.  Encourage more nutrition
Patient does not have any diabetes.  Short frequent meals.  Will obviously require no diabetic medications

## 2022-03-20 NOTE — PROGRESS NOTE ADULT - ASSESSMENT
Pt is a 40 yo M with h/o DM diagnosed 3 months ago per family, hypothyroidism, Sz d/o, craniotomy (family unsure why he had craniotomy), ETOH abuse (daily drinker), pancreatitis presents after being found altered and unresponsive. Pt reported by EMS to be hypoglycemic to the 20s then with witnessed cardiac arrest en route. ROSC 10min with EMS. Unclear if pt had Sz prior to event leading to unresponsiveness at home vs duration of hypoglycemia prior to EMS arrival. Weaned and extubated 3/19. Post extubation with confusion/disorientation, tachycardia, hand tremors concern for developing ETOH withdrawal. ICU dx: 1) cardiac arrest 2) respiratory arrest/acute respiratory failure requiring intubation, 3) hypoglycemia 4) ETOH abuse with ETOH withdrawal/DTs    Resp: Supplemental O2 prn to maintain O2 sat >92%  ID: Dc Zosyn at day 5  CVS: Dc Midodrine  Heme: Pt thrombocytopenic; follow platelets  FEN: NPO until MS improves/ Replace Phos; pt hypophosphatemic   Endo: Follow FS/ Cont pt's Synthroid   GI: Elevated LFTs; shock liver + alcoholic hepatitis  Neuro/Psych: Start standing IV + prn Phenobarb   Pt is a 42 yo M with h/o DM diagnosed 3 months ago per family, hypothyroidism, Sz d/o, craniotomy (family unsure why he had craniotomy), ETOH abuse (daily drinker), pancreatitis presents after being found altered and unresponsive. Pt reported by EMS to be hypoglycemic to the 20s then with witnessed cardiac arrest en route. ROSC 10min with EMS. Unclear if pt had Sz prior to event leading to unresponsiveness at home vs duration of hypoglycemia prior to EMS arrival. Weaned and extubated 3/19. Post extubation with confusion/disorientation, tachycardia, hand tremors concern for developing ETOH withdrawal. ICU dx: 1) cardiac arrest 2) respiratory arrest/acute respiratory failure requiring intubation, 3) hypoglycemia 4) ETOH abuse with ETOH withdrawal/DTs    Resp: Supplemental O2 prn to maintain O2 sat >92%  ID: Dc Zosyn at day 5  CVS: Dc Midodrine  Heme: Pt thrombocytopenic; follow platelets  FEN: NPO until MS improves/ Replace Phos; pt hypophosphatemic   Endo: Follow FS/ Cont pt's Synthroid   GI: Elevated LFTs; shock liver + alcoholic hepatitis  Neuro/Psych: Start standing IV + prn Phenobarb/ Cont pt's Keppra

## 2022-03-20 NOTE — PROGRESS NOTE ADULT - SUBJECTIVE AND OBJECTIVE BOX
Patient is a 41y old  Male who presents with a chief complaint of S/P Cardiac Arrest, Hypoglycemia, JHONATAN, metabolic acidosis, Transaminitis (19 Mar 2022 09:24)      Interval History: Continued on 25 MCG of levothyroxine and blood glucose is in the 100s.  Patient clinically stable    MEDICATIONS  (STANDING):  ascorbic acid 500 milliGRAM(s) Oral daily  chlorhexidine 2% Cloths 1 Application(s) Topical daily  dextrose 5%. 1000 milliLiter(s) (50 mL/Hr) IV Continuous <Continuous>  folic acid 1 milliGRAM(s) Oral daily  lactulose Syrup 10 Gram(s) Oral every 12 hours  levETIRAcetam  IVPB 750 milliGRAM(s) IV Intermittent every 12 hours  levothyroxine 25 MICROGram(s) Oral daily  multivitamin 1 Tablet(s) Oral daily  PHENobarbital Injectable   IV Push   PHENobarbital Injectable 65 milliGRAM(s) IV Push every 6 hours  piperacillin/tazobactam IVPB... 3.375 Gram(s) IV Intermittent every 8 hours  rifAXIMin 550 milliGRAM(s) Oral two times a day  thiamine 100 milliGRAM(s) Oral daily    MEDICATIONS  (PRN):  PHENobarbital Injectable 65 milliGRAM(s) IV Push every 2 hours PRN CIWA >8      Allergies    Allergy Status Unknown    Intolerances        REVIEW OF SYSTEMS:  CONSTITUTIONAL: no changes      Vital Signs Last 24 Hrs  T(C): 38.2 (20 Mar 2022 16:00), Max: 38.2 (20 Mar 2022 16:00)  T(F): 100.7 (20 Mar 2022 16:00), Max: 100.7 (20 Mar 2022 16:00)  HR: 110 (20 Mar 2022 16:00) (89 - 129)  BP: 141/94 (20 Mar 2022 16:00) (87/57 - 162/139)  BP(mean): 105 (20 Mar 2022 16:00) (63 - 144)  RR: 23 (20 Mar 2022 16:00) (12 - 32)  SpO2: 93% (20 Mar 2022 16:00) (90% - 99%)    PHYSICAL EXAM:  GENERAL:   HEAD: Atraumatic, Normocephalic  EYES: PERRLA, conjunctiva and sclera clear  ENMT: No  exudates,; Moist mucous membranes,, No lesions  NECK: Supple, No JVD, Normal thyroid  NERVOUS SYSTEM:  Alert & Oriented,   CHEST/LUNG: Clear to auscultation bilaterally; No rales, rhonchi, wheezing, or rubs  HEART: Regular rate and rhythm; No murmurs, rubs, or gallops  ABDOMEN: Soft, Nontender, Nondistended; Bowel sounds present  EXTREMITIES:  2+ Peripheral Pulses, no edema  SKIN: No rashes or lesions    LABS:        CAPILLARY BLOOD GLUCOSE      POCT Blood Glucose.: 134 mg/dL (20 Mar 2022 11:42)  POCT Blood Glucose.: 142 mg/dL (20 Mar 2022 05:03)  POCT Blood Glucose.: 178 mg/dL (19 Mar 2022 23:12)  POCT Blood Glucose.: 184 mg/dL (19 Mar 2022 17:19)    Lipid panel:           Thyroid:  Diabetes Tests:  Parathyroid Panel:  Adrenals:  RADIOLOGY & ADDITIONAL TESTS:    Imaging Personally Reviewed:  [ ] YES  [ ] NO    Consultant(s) Notes Reviewed:  [ ] YES  [ ] NO    Care Discussed with Consultants/Other Providers [ ] YES  [ ] NO

## 2022-03-20 NOTE — PROGRESS NOTE ADULT - SUBJECTIVE AND OBJECTIVE BOX
HPI:  Pt is a 40 yo M with h/o DM diagnosed 3 months ago per family, Sz d/o, craniotomy (family unsure why he had craniotomy), ETOH abuse (daily drinker), pancreatitis presents after being found altered and unresponsive. Pt reported by EMS to be hypoglycemic to the 20s then with witnessed cardiac arrest en route. ROSC 10min with EMS. Unclear if pt had Sz prior to event leading to unresponsiveness at home vs duration of hypoglycemia prior to EMS arrival. Weaned and extubated 3/19. Post extubation with confusion/disorientation, tachycardia, hand tremors concern for developing ETOH withdrawal. ICU dx: 1) cardiac arrest 2) respiratory arrest/acute respiratory failure requiring intubation, 3) hypoglycemia 4) ETOH abuse with ETOH withdrawal/DTs      ## Labs:  CBC:                        8.2    6.97  )-----------( 62       ( 20 Mar 2022 02:18 )             24.1     Chem:      149<H>  |  113<H>  |  21  ----------------------------<  142<H>  3.7   |  27  |  0.94    Ca    8.4<L>      20 Mar 2022 11:57  Phos  1.6       Mg     2.2         TPro  5.8<L>  /  Alb  3.0<L>  /  TBili  7.6<H>  /  DBili  x   /  AST  492<H>  /  ALT  194<H>  /  AlkPhos  226<H>      Coags:          ## Imaging:    ## Medications:  piperacillin/tazobactam IVPB... 3.375 Gram(s) IV Intermittent every 8 hours  rifAXIMin 550 milliGRAM(s) Oral two times a day    midodrine 10 milliGRAM(s) Oral every 8 hours      levothyroxine 25 MICROGram(s) Oral daily      lactulose Syrup 10 Gram(s) Oral every 12 hours    levETIRAcetam  IVPB 750 milliGRAM(s) IV Intermittent every 12 hours  PHENobarbital Injectable   IV Push   PHENobarbital Injectable 65 milliGRAM(s) IV Push every 2 hours PRN  PHENobarbital Injectable 65 milliGRAM(s) IV Push every 6 hours      ## Vitals:  T(C): 37.8 (22 @ 12:00), Max: 37.8 (22 @ 12:00)  HR: 109 (22 @ 14:00) (89 - 129)  BP: 150/112 (22 @ 14:00) (87/57 - 162/139)  BP(mean): 121 (22 @ 14:00) (63 - 144)  RR: 20 (22 @ 14:00) (12 - 32)  SpO2: 98% (22 @ 14:00) (90% - 99%)  Wt(kg): --  Vent:   AB-19 @ 07:01  -   @ 07:00  --------------------------------------------------------  IN: 1960 mL / OUT: 870 mL / NET: 1090 mL     @ 07:01  -   @ 14:58  --------------------------------------------------------  IN: 700 mL / OUT: 0 mL / NET: 700 mL          ## P/E:  Gen: lying comfortably in bed in no apparent distress  Lungs: CTA, no wheezes  Heart:  Tachy, no murmur  Abd: Soft/+BS/ Non-tender  Ext: L hand edema  Neuro: Awake, babbling, not following commands    CENTRAL LINE: [ ] YES [ ] NO  LOCATION:   DATE INSERTED:  REMOVE: [ ] YES [ ] NO      ANTUNEZ: [ ] YES [ ] NO    DATE INSERTED:  REMOVE:  [ ] YES [ ] NO      A-LINE:  [ ] YES [ ] NO  LOCATION:   DATE INSERTED:  REMOVE:  [ ] YES [ ] NO  EXPLAIN:      CODE STATUS: [x ] full code  [ ] DNR  [ ] DNI  [ ] MOLST  Goals of care discussion: [ ] yes

## 2022-03-21 LAB
ALBUMIN SERPL ELPH-MCNC: 2.8 G/DL — LOW (ref 3.3–5)
ALP SERPL-CCNC: 250 U/L — HIGH (ref 40–120)
ALT FLD-CCNC: 188 U/L — HIGH (ref 12–78)
ANION GAP SERPL CALC-SCNC: 10 MMOL/L — SIGNIFICANT CHANGE UP (ref 5–17)
AST SERPL-CCNC: 362 U/L — HIGH (ref 15–37)
BILIRUB SERPL-MCNC: 6.7 MG/DL — HIGH (ref 0.2–1.2)
BUN SERPL-MCNC: 14 MG/DL — SIGNIFICANT CHANGE UP (ref 7–23)
CALCIUM SERPL-MCNC: 8.5 MG/DL — SIGNIFICANT CHANGE UP (ref 8.5–10.1)
CHLORIDE SERPL-SCNC: 108 MMOL/L — SIGNIFICANT CHANGE UP (ref 96–108)
CO2 SERPL-SCNC: 28 MMOL/L — SIGNIFICANT CHANGE UP (ref 22–31)
CREAT SERPL-MCNC: 0.86 MG/DL — SIGNIFICANT CHANGE UP (ref 0.5–1.3)
EGFR: 112 ML/MIN/1.73M2 — SIGNIFICANT CHANGE UP
GLUCOSE BLDC GLUCOMTR-MCNC: 205 MG/DL — HIGH (ref 70–99)
GLUCOSE BLDC GLUCOMTR-MCNC: 224 MG/DL — HIGH (ref 70–99)
GLUCOSE SERPL-MCNC: 184 MG/DL — HIGH (ref 70–99)
HCT VFR BLD CALC: 28.7 % — LOW (ref 39–50)
HGB BLD-MCNC: 9.9 G/DL — LOW (ref 13–17)
MAGNESIUM SERPL-MCNC: 1.8 MG/DL — SIGNIFICANT CHANGE UP (ref 1.6–2.6)
MCHC RBC-ENTMCNC: 34.5 G/DL — SIGNIFICANT CHANGE UP (ref 32–36)
MCHC RBC-ENTMCNC: 35.9 PG — HIGH (ref 27–34)
MCV RBC AUTO: 104 FL — HIGH (ref 80–100)
MRSA PCR RESULT.: SIGNIFICANT CHANGE UP
NRBC # BLD: 2 /100 WBCS — HIGH (ref 0–0)
PHOSPHATE SERPL-MCNC: 1.8 MG/DL — LOW (ref 2.5–4.5)
PLATELET # BLD AUTO: 66 K/UL — LOW (ref 150–400)
POTASSIUM SERPL-MCNC: 3.3 MMOL/L — LOW (ref 3.5–5.3)
POTASSIUM SERPL-SCNC: 3.3 MMOL/L — LOW (ref 3.5–5.3)
PROCALCITONIN SERPL-MCNC: 0.86 NG/ML — HIGH (ref 0.02–0.1)
PROT SERPL-MCNC: 5.7 GM/DL — LOW (ref 6–8.3)
RBC # BLD: 2.76 M/UL — LOW (ref 4.2–5.8)
RBC # FLD: 14.6 % — HIGH (ref 10.3–14.5)
S AUREUS DNA NOSE QL NAA+PROBE: SIGNIFICANT CHANGE UP
SODIUM SERPL-SCNC: 146 MMOL/L — HIGH (ref 135–145)
WBC # BLD: 12.71 K/UL — HIGH (ref 3.8–10.5)
WBC # FLD AUTO: 12.71 K/UL — HIGH (ref 3.8–10.5)

## 2022-03-21 PROCEDURE — 99291 CRITICAL CARE FIRST HOUR: CPT

## 2022-03-21 PROCEDURE — 71045 X-RAY EXAM CHEST 1 VIEW: CPT | Mod: 26

## 2022-03-21 RX ORDER — SODIUM CHLORIDE 9 MG/ML
1000 INJECTION, SOLUTION INTRAVENOUS
Refills: 0 | Status: DISCONTINUED | OUTPATIENT
Start: 2022-03-21 | End: 2022-03-25

## 2022-03-21 RX ORDER — SODIUM CHLORIDE 9 MG/ML
1000 INJECTION, SOLUTION INTRAVENOUS
Refills: 0 | Status: DISCONTINUED | OUTPATIENT
Start: 2022-03-21 | End: 2022-03-22

## 2022-03-21 RX ORDER — POTASSIUM CHLORIDE 20 MEQ
40 PACKET (EA) ORAL ONCE
Refills: 0 | Status: COMPLETED | OUTPATIENT
Start: 2022-03-21 | End: 2022-03-21

## 2022-03-21 RX ORDER — GLUCAGON INJECTION, SOLUTION 0.5 MG/.1ML
1 INJECTION, SOLUTION SUBCUTANEOUS ONCE
Refills: 0 | Status: DISCONTINUED | OUTPATIENT
Start: 2022-03-21 | End: 2022-03-25

## 2022-03-21 RX ORDER — LEVETIRACETAM 250 MG/1
1500 TABLET, FILM COATED ORAL EVERY 12 HOURS
Refills: 0 | Status: DISCONTINUED | OUTPATIENT
Start: 2022-03-21 | End: 2022-03-22

## 2022-03-21 RX ORDER — POTASSIUM PHOSPHATE, MONOBASIC POTASSIUM PHOSPHATE, DIBASIC 236; 224 MG/ML; MG/ML
30 INJECTION, SOLUTION INTRAVENOUS ONCE
Refills: 0 | Status: COMPLETED | OUTPATIENT
Start: 2022-03-21 | End: 2022-03-21

## 2022-03-21 RX ORDER — DEXTROSE 50 % IN WATER 50 %
12.5 SYRINGE (ML) INTRAVENOUS ONCE
Refills: 0 | Status: DISCONTINUED | OUTPATIENT
Start: 2022-03-21 | End: 2022-03-25

## 2022-03-21 RX ORDER — DEXTROSE 50 % IN WATER 50 %
25 SYRINGE (ML) INTRAVENOUS ONCE
Refills: 0 | Status: DISCONTINUED | OUTPATIENT
Start: 2022-03-21 | End: 2022-03-25

## 2022-03-21 RX ORDER — AMLODIPINE BESYLATE 2.5 MG/1
10 TABLET ORAL DAILY
Refills: 0 | Status: DISCONTINUED | OUTPATIENT
Start: 2022-03-21 | End: 2022-03-25

## 2022-03-21 RX ORDER — ENOXAPARIN SODIUM 100 MG/ML
40 INJECTION SUBCUTANEOUS EVERY 24 HOURS
Refills: 0 | Status: DISCONTINUED | OUTPATIENT
Start: 2022-03-21 | End: 2022-03-25

## 2022-03-21 RX ORDER — MAGNESIUM SULFATE 500 MG/ML
2 VIAL (ML) INJECTION ONCE
Refills: 0 | Status: COMPLETED | OUTPATIENT
Start: 2022-03-21 | End: 2022-03-21

## 2022-03-21 RX ORDER — INSULIN LISPRO 100/ML
VIAL (ML) SUBCUTANEOUS
Refills: 0 | Status: DISCONTINUED | OUTPATIENT
Start: 2022-03-21 | End: 2022-03-25

## 2022-03-21 RX ORDER — DEXTROSE 50 % IN WATER 50 %
15 SYRINGE (ML) INTRAVENOUS ONCE
Refills: 0 | Status: DISCONTINUED | OUTPATIENT
Start: 2022-03-21 | End: 2022-03-25

## 2022-03-21 RX ORDER — LACTULOSE 10 G/15ML
10 SOLUTION ORAL DAILY
Refills: 0 | Status: DISCONTINUED | OUTPATIENT
Start: 2022-03-21 | End: 2022-03-25

## 2022-03-21 RX ADMIN — LEVETIRACETAM 400 MILLIGRAM(S): 250 TABLET, FILM COATED ORAL at 21:00

## 2022-03-21 RX ADMIN — Medication 65 MILLIGRAM(S): at 21:00

## 2022-03-21 RX ADMIN — PIPERACILLIN AND TAZOBACTAM 200 GRAM(S): 4; .5 INJECTION, POWDER, LYOPHILIZED, FOR SOLUTION INTRAVENOUS at 21:00

## 2022-03-21 RX ADMIN — LEVETIRACETAM 400 MILLIGRAM(S): 250 TABLET, FILM COATED ORAL at 10:46

## 2022-03-21 RX ADMIN — LACTULOSE 10 GRAM(S): 10 SOLUTION ORAL at 05:24

## 2022-03-21 RX ADMIN — Medication 40 MILLIEQUIVALENT(S): at 05:54

## 2022-03-21 RX ADMIN — Medication 65 MILLIGRAM(S): at 13:30

## 2022-03-21 RX ADMIN — Medication 100 MILLIGRAM(S): at 11:20

## 2022-03-21 RX ADMIN — POTASSIUM PHOSPHATE, MONOBASIC POTASSIUM PHOSPHATE, DIBASIC 83.33 MILLIMOLE(S): 236; 224 INJECTION, SOLUTION INTRAVENOUS at 06:16

## 2022-03-21 RX ADMIN — ENOXAPARIN SODIUM 40 MILLIGRAM(S): 100 INJECTION SUBCUTANEOUS at 10:46

## 2022-03-21 RX ADMIN — PIPERACILLIN AND TAZOBACTAM 200 GRAM(S): 4; .5 INJECTION, POWDER, LYOPHILIZED, FOR SOLUTION INTRAVENOUS at 13:30

## 2022-03-21 RX ADMIN — Medication 1 MILLIGRAM(S): at 11:20

## 2022-03-21 RX ADMIN — Medication 2: at 16:21

## 2022-03-21 RX ADMIN — PIPERACILLIN AND TAZOBACTAM 200 GRAM(S): 4; .5 INJECTION, POWDER, LYOPHILIZED, FOR SOLUTION INTRAVENOUS at 05:55

## 2022-03-21 RX ADMIN — Medication 25 MICROGRAM(S): at 05:18

## 2022-03-21 RX ADMIN — Medication 65 MILLIGRAM(S): at 05:54

## 2022-03-21 RX ADMIN — Medication 1 TABLET(S): at 11:22

## 2022-03-21 RX ADMIN — CHLORHEXIDINE GLUCONATE 1 APPLICATION(S): 213 SOLUTION TOPICAL at 04:00

## 2022-03-21 RX ADMIN — AMLODIPINE BESYLATE 10 MILLIGRAM(S): 2.5 TABLET ORAL at 05:54

## 2022-03-21 RX ADMIN — PANTOPRAZOLE SODIUM 40 MILLIGRAM(S): 20 TABLET, DELAYED RELEASE ORAL at 08:15

## 2022-03-21 RX ADMIN — Medication 2: at 10:46

## 2022-03-21 RX ADMIN — SODIUM CHLORIDE 50 MILLILITER(S): 9 INJECTION, SOLUTION INTRAVENOUS at 11:20

## 2022-03-21 RX ADMIN — Medication 500 MILLIGRAM(S): at 11:20

## 2022-03-21 RX ADMIN — Medication 25 GRAM(S): at 05:18

## 2022-03-21 NOTE — PROGRESS NOTE ADULT - ASSESSMENT
41M w/ DM, hypothyroidism, seizure disorder, h/o craniotomy (unsure why), EtOH abuse, pancreatitis. PT found down and unresponsive. FS in 20s and then had witnessed cardiac arrest en route w/ ROSC after 10 minutes. Admitted to ICU for post-arrest management. Pt extubated on 3/19 and now w/ concerns for EtOH withdrawal.     #Neuro - on phenobarb for EtOH withdrawal, has not received PRN phenobarb overnight; unclear if current mental status is from withdrawal or as result of cerebral hypoxia, will continue to monitor for now; continue w/ phenobarb, increase to home dose  #CV - started on amlodipine for elevated BP, continue to monitor for now   #Pulm - satting well on RA  #ID- on zosyn for likely aspiration, all cx NGTD; pt spiked low grade temp last night and increased WBC count- repeat blood cx and procal sent last night; continue w/ zosyn for now  #Renal/metabolic - normal renal function; hypoK, hypophos and hypoMg repleted; continue w/ D5 for hypernatremia which is improving  #GI- able to take some PO while being fed, speech and swallow pending; continue w/ protonix; elevated LFTs likely in setting of cardiac arrest and alcoholic hepatitis, RUQ US showing cirrhosis, continue to monitor; ammonia normalized, continue w/ rifaxamin and lactulose  #Heme - chronic thrombocytopenia, improving  #Endo - no further episodes of hypoglycemia and normal hgba1c, monitor FS w/ ISS; continue w/ levothyroxine  #PPx - start lovenox for DVT ppx as plts >50k for several days  #Dispo- monitor in ICU today for continued withdrawal and encephalopathy; prognosis guarded; full code

## 2022-03-21 NOTE — PROGRESS NOTE ADULT - SUBJECTIVE AND OBJECTIVE BOX
CHIEF COMPLAINT:    Interval Events:    REVIEW OF SYSTEMS:  Constitutional: [ ] fevers [ ] chills [ ] weight loss [ ] weight gain  HEENT: [ ] dry eyes [ ] eye irritation [ ] postnasal drip [ ] nasal congestion  CV: [ ] chest pain [ ] orthopnea [ ] palpitations [ ] murmur  Resp: [ ] cough [ ] shortness of breath [ ] dyspnea [ ] wheezing [ ] sputum [ ] hemoptysis  GI: [ ] nausea [ ] vomiting [ ] diarrhea [ ] constipation [ ] abd pain [ ] dysphagia   : [ ] dysuria [ ] nocturia [ ] hematuria [ ] increased urinary frequency  Musculoskeletal: [ ] back pain [ ] myalgias [ ] arthralgias [ ] fracture  Skin: [ ] rash [ ] itch  Neurological: [ ] headache [ ] dizziness [ ] syncope [ ] weakness [ ] numbness  Hematologic/Lymphatic: [ ] anemia [ ] bleeding problem  Allergic/Immunologic: [ ] itchy eyes [ ] nasal discharge [ ] hives [ ] angioedema  [ ] All other systems negative  [ ] Unable to assess ROS because ________    OBJECTIVE:  ICU Vital Signs Last 24 Hrs  T(C): 38.2 (21 Mar 2022 08:00), Max: 38.2 (20 Mar 2022 16:00)  T(F): 100.7 (21 Mar 2022 08:00), Max: 100.8 (21 Mar 2022 04:00)  HR: 117 (21 Mar 2022 08:00) (104 - 135)  BP: 128/96 (21 Mar 2022 08:00) (120/90 - 168/123)  BP(mean): 103 (21 Mar 2022 08:00) (97 - 144)  ABP: --  ABP(mean): --  RR: 22 (21 Mar 2022 08:00) (17 - 36)  SpO2: 100% (21 Mar 2022 08:00) (91% - 100%)        03-20 @ 07:01  -  03-21 @ 07:00  --------------------------------------------------------  IN: 1300 mL / OUT: 0 mL / NET: 1300 mL      CAPILLARY BLOOD GLUCOSE      POCT Blood Glucose.: 204 mg/dL (20 Mar 2022 23:22)      PHYSICAL EXAM:  General:   HEENT:   Neck:   Respiratory:   Cardiovascular:   Abdomen:   Extremities:   Skin:   Neurological:  Psychiatry:    LINES:    HOSPITAL MEDICATIONS:  MEDICATIONS  (STANDING):  amLODIPine   Tablet 10 milliGRAM(s) Oral daily  ascorbic acid 500 milliGRAM(s) Oral daily  chlorhexidine 2% Cloths 1 Application(s) Topical daily  dextrose 5%. 1000 milliLiter(s) (50 mL/Hr) IV Continuous <Continuous>  folic acid 1 milliGRAM(s) Oral daily  lactulose Syrup 10 Gram(s) Oral every 12 hours  levETIRAcetam  IVPB 750 milliGRAM(s) IV Intermittent every 12 hours  levothyroxine 25 MICROGram(s) Oral daily  multivitamin 1 Tablet(s) Oral daily  pantoprazole    Tablet 40 milliGRAM(s) Oral before breakfast  PHENobarbital Injectable   IV Push   PHENobarbital Injectable 65 milliGRAM(s) IV Push every 8 hours  piperacillin/tazobactam IVPB... 3.375 Gram(s) IV Intermittent every 8 hours  rifAXIMin 550 milliGRAM(s) Oral two times a day  thiamine 100 milliGRAM(s) Oral daily    MEDICATIONS  (PRN):  PHENobarbital Injectable 65 milliGRAM(s) IV Push every 2 hours PRN CIWA >8      LABS:                        9.9    12.71 )-----------( 66       ( 21 Mar 2022 04:38 )             28.7     Hgb Trend: 9.9<--, 8.2<--, 8.8<--, 8.9<--, 10.7<--  03-21    146<H>  |  108  |  14  ----------------------------<  184<H>  3.3<L>   |  28  |  0.86    Ca    8.5      21 Mar 2022 04:38  Phos  1.8     03-21  Mg     1.8     03-21    TPro  5.7<L>  /  Alb  2.8<L>  /  TBili  6.7<H>  /  DBili  x   /  AST  362<H>  /  ALT  188<H>  /  AlkPhos  250<H>  03-21              MICROBIOLOGY:     RADIOLOGY:  [ ] Reviewed and interpreted by me CHIEF COMPLAINT:    Interval Events:  Tmax 100.8  started on amlodipine for rising BP  last additional dose of phenobarb given at 8pm last night    REVIEW OF SYSTEMS:  [x ] Unable to assess ROS because encephalopathic    OBJECTIVE:  ICU Vital Signs Last 24 Hrs  T(C): 38.2 (21 Mar 2022 08:00), Max: 38.2 (20 Mar 2022 16:00)  T(F): 100.7 (21 Mar 2022 08:00), Max: 100.8 (21 Mar 2022 04:00)  HR: 117 (21 Mar 2022 08:00) (104 - 135)  BP: 128/96 (21 Mar 2022 08:00) (120/90 - 168/123)  BP(mean): 103 (21 Mar 2022 08:00) (97 - 144)  ABP: --  ABP(mean): --  RR: 22 (21 Mar 2022 08:00) (17 - 36)  SpO2: 100% (21 Mar 2022 08:00) (91% - 100%)        03-20 @ 07:01  -  03-21 @ 07:00  --------------------------------------------------------  IN: 1300 mL / OUT: 0 mL / NET: 1300 mL      CAPILLARY BLOOD GLUCOSE      POCT Blood Glucose.: 204 mg/dL (20 Mar 2022 23:22)      PHYSICAL EXAM:  General: NAD, non toxic appearing  HEENT: dry MM, scleral icterus, EOMI  Neck: supple  Respiratory: CTA b/l  Cardiovascular: s1s2 RRR  Abdomen: soft, non tender, non distended  Extremities: warm, no edema or clubbing  Skin: intact  Neurological: moving all extremities  Psychiatry: answers to name, difficulty focusing, does not know where he is    LINES:  [facility]    HOSPITAL MEDICATIONS:  MEDICATIONS  (STANDING):  amLODIPine   Tablet 10 milliGRAM(s) Oral daily  ascorbic acid 500 milliGRAM(s) Oral daily  chlorhexidine 2% Cloths 1 Application(s) Topical daily  dextrose 5%. 1000 milliLiter(s) (50 mL/Hr) IV Continuous <Continuous>  folic acid 1 milliGRAM(s) Oral daily  lactulose Syrup 10 Gram(s) Oral every 12 hours  levETIRAcetam  IVPB 750 milliGRAM(s) IV Intermittent every 12 hours  levothyroxine 25 MICROGram(s) Oral daily  multivitamin 1 Tablet(s) Oral daily  pantoprazole    Tablet 40 milliGRAM(s) Oral before breakfast  PHENobarbital Injectable   IV Push   PHENobarbital Injectable 65 milliGRAM(s) IV Push every 8 hours  piperacillin/tazobactam IVPB... 3.375 Gram(s) IV Intermittent every 8 hours  rifAXIMin 550 milliGRAM(s) Oral two times a day  thiamine 100 milliGRAM(s) Oral daily    MEDICATIONS  (PRN):  PHENobarbital Injectable 65 milliGRAM(s) IV Push every 2 hours PRN CIWA >8      LABS:                        9.9    12.71 )-----------( 66       ( 21 Mar 2022 04:38 )             28.7     Hgb Trend: 9.9<--, 8.2<--, 8.8<--, 8.9<--, 10.7<--  03-21    146<H>  |  108  |  14  ----------------------------<  184<H>  3.3<L>   |  28  |  0.86    Ca    8.5      21 Mar 2022 04:38  Phos  1.8     03-21  Mg     1.8     03-21    TPro  5.7<L>  /  Alb  2.8<L>  /  TBili  6.7<H>  /  DBili  x   /  AST  362<H>  /  ALT  188<H>  /  AlkPhos  250<H>  03-21              MICROBIOLOGY:     RADIOLOGY:  [ ] Reviewed and interpreted by me

## 2022-03-21 NOTE — PROGRESS NOTE ADULT - SUBJECTIVE AND OBJECTIVE BOX
Patient is a 41y old  Male who presents with a chief complaint of S/P Cardiac Arrest, Hypoglycemia, JHONATAN, metabolic acidosis, Transaminitis (19 Mar 2022 09:24)      Interval History: Fingersticks are in the normal range.  Patient has HbA1c of 5.3 and has no history of diabetes and is not on any diabetic medication.  He is however on 25 MCG of levothyroxine for a slightly increased TSH    MEDICATIONS  (STANDING):  amLODIPine   Tablet 10 milliGRAM(s) Oral daily  ascorbic acid 500 milliGRAM(s) Oral daily  chlorhexidine 2% Cloths 1 Application(s) Topical daily  dextrose 40% Gel 15 Gram(s) Oral once  dextrose 5%. 1000 milliLiter(s) (50 mL/Hr) IV Continuous <Continuous>  dextrose 5%. 1000 milliLiter(s) (50 mL/Hr) IV Continuous <Continuous>  dextrose 5%. 1000 milliLiter(s) (100 mL/Hr) IV Continuous <Continuous>  dextrose 50% Injectable 25 Gram(s) IV Push once  dextrose 50% Injectable 12.5 Gram(s) IV Push once  dextrose 50% Injectable 25 Gram(s) IV Push once  enoxaparin Injectable 40 milliGRAM(s) SubCutaneous every 24 hours  folic acid 1 milliGRAM(s) Oral daily  glucagon  Injectable 1 milliGRAM(s) IntraMuscular once  insulin lispro (ADMELOG) corrective regimen sliding scale   SubCutaneous three times a day before meals  lactulose Syrup 10 Gram(s) Oral every 12 hours  levETIRAcetam  IVPB 1500 milliGRAM(s) IV Intermittent every 12 hours  levothyroxine 25 MICROGram(s) Oral daily  multivitamin 1 Tablet(s) Oral daily  pantoprazole    Tablet 40 milliGRAM(s) Oral before breakfast  PHENobarbital Injectable   IV Push   PHENobarbital Injectable 65 milliGRAM(s) IV Push every 8 hours  piperacillin/tazobactam IVPB... 3.375 Gram(s) IV Intermittent every 8 hours  rifAXIMin 550 milliGRAM(s) Oral two times a day  thiamine 100 milliGRAM(s) Oral daily    MEDICATIONS  (PRN):  PHENobarbital Injectable 65 milliGRAM(s) IV Push every 2 hours PRN CIWA >8      Allergies    Allergy Status Unknown    Intolerances        REVIEW OF SYSTEMS:  CONSTITUTIONAL: no changes  EYES: No eye pain, visual disturbances, or discharge  ENMT:  No difficulty hearing, No sinus or throat pain  NECK: No pain or stiffness  RESPIRATORY: No cough, wheezing, chills or hemoptysis; No shortness of breath  CARDIOVASCULAR: No chest pain, palpitations or leg swelling  GASTROINTESTINAL: No abdominal or epigastric pain. No nausea, vomiting, or hematemesis; No diarrhea or constipation. No melena or hematochezia.  GENITOURINARY: No dysuria, frequency, hematuria, or incontinence  NEUROLOGICAL: No headaches, memory loss, loss of strength, numbness, or tremors  SKIN: No itching, burning, rashes, or lesions   ENDOCRINE: No heat or cold intolerance; No hair loss  MUSCULOSKELETAL: No joint pain or swelling; No muscle, back, or extremity pain  PSYCHIATRIC: No depression, anxiety, mood swings, or difficulty sleeping  HEME/LYMPH: No easy bruising, or bleeding gums  ALLERY AND IMMUNOLOGIC: No hives or eczema    Vital Signs Last 24 Hrs  T(C): 38.2 (21 Mar 2022 08:00), Max: 38.2 (20 Mar 2022 16:00)  T(F): 100.7 (21 Mar 2022 08:00), Max: 100.8 (21 Mar 2022 04:00)  HR: 111 (21 Mar 2022 09:00) (104 - 135)  BP: 136/111 (21 Mar 2022 09:00) (120/90 - 168/123)  BP(mean): 115 (21 Mar 2022 09:00) (97 - 144)  RR: 20 (21 Mar 2022 09:00) (17 - 36)  SpO2: 96% (21 Mar 2022 09:00) (91% - 100%)    PHYSICAL EXAM:  GENERAL:   HEAD: Atraumatic, Normocephalic  EYES: PERRLA, conjunctiva and sclera clear  ENMT: No  exudates,; Moist mucous membranes,, No lesions  NECK: Supple, No JVD, Normal thyroid  NERVOUS SYSTEM:  Alert & Oriented,   CHEST/LUNG: Clear to auscultation bilaterally; No rales, rhonchi, wheezing, or rubs  HEART: Regular rate and rhythm; No murmurs, rubs, or gallops  ABDOMEN: Soft, Nontender, Nondistended; Bowel sounds present  EXTREMITIES:  2+ Peripheral Pulses, no edema  SKIN: No rashes or lesions    LABS:        CAPILLARY BLOOD GLUCOSE      POCT Blood Glucose.: 204 mg/dL (20 Mar 2022 23:22)  POCT Blood Glucose.: 184 mg/dL (20 Mar 2022 17:47)  POCT Blood Glucose.: 134 mg/dL (20 Mar 2022 11:42)    Lipid panel:           Thyroid:  Diabetes Tests:  Parathyroid Panel:  Adrenals:  RADIOLOGY & ADDITIONAL TESTS:    Imaging Personally Reviewed:  [ ] YES  [ ] NO    Consultant(s) Notes Reviewed:  [ ] YES  [ ] NO    Care Discussed with Consultants/Other Providers [ ] YES  [ ] NO

## 2022-03-22 LAB
ALBUMIN SERPL ELPH-MCNC: 2.4 G/DL — LOW (ref 3.3–5)
ALP SERPL-CCNC: 223 U/L — HIGH (ref 40–120)
ALT FLD-CCNC: 150 U/L — HIGH (ref 12–78)
ANION GAP SERPL CALC-SCNC: 9 MMOL/L — SIGNIFICANT CHANGE UP (ref 5–17)
AST SERPL-CCNC: 227 U/L — HIGH (ref 15–37)
BILIRUB SERPL-MCNC: 4.6 MG/DL — HIGH (ref 0.2–1.2)
BUN SERPL-MCNC: 8 MG/DL — SIGNIFICANT CHANGE UP (ref 7–23)
CALCIUM SERPL-MCNC: 8.3 MG/DL — LOW (ref 8.5–10.1)
CHLORIDE SERPL-SCNC: 108 MMOL/L — SIGNIFICANT CHANGE UP (ref 96–108)
CO2 SERPL-SCNC: 26 MMOL/L — SIGNIFICANT CHANGE UP (ref 22–31)
CREAT SERPL-MCNC: 0.7 MG/DL — SIGNIFICANT CHANGE UP (ref 0.5–1.3)
CULTURE RESULTS: SIGNIFICANT CHANGE UP
CULTURE RESULTS: SIGNIFICANT CHANGE UP
EGFR: 119 ML/MIN/1.73M2 — SIGNIFICANT CHANGE UP
GLUCOSE BLDC GLUCOMTR-MCNC: 117 MG/DL — HIGH (ref 70–99)
GLUCOSE BLDC GLUCOMTR-MCNC: 131 MG/DL — HIGH (ref 70–99)
GLUCOSE BLDC GLUCOMTR-MCNC: 148 MG/DL — HIGH (ref 70–99)
GLUCOSE BLDC GLUCOMTR-MCNC: 176 MG/DL — HIGH (ref 70–99)
GLUCOSE BLDC GLUCOMTR-MCNC: 185 MG/DL — HIGH (ref 70–99)
GLUCOSE SERPL-MCNC: 127 MG/DL — HIGH (ref 70–99)
HCT VFR BLD CALC: 25.9 % — LOW (ref 39–50)
HGB BLD-MCNC: 9 G/DL — LOW (ref 13–17)
LEVETIRACETAM SERPL-MCNC: 6.7 UG/ML — LOW (ref 10–40)
MAGNESIUM SERPL-MCNC: 1.6 MG/DL — SIGNIFICANT CHANGE UP (ref 1.6–2.6)
MCHC RBC-ENTMCNC: 34.7 G/DL — SIGNIFICANT CHANGE UP (ref 32–36)
MCHC RBC-ENTMCNC: 36.1 PG — HIGH (ref 27–34)
MCV RBC AUTO: 104 FL — HIGH (ref 80–100)
NRBC # BLD: 2 /100 WBCS — HIGH (ref 0–0)
PHOSPHATE SERPL-MCNC: 2 MG/DL — LOW (ref 2.5–4.5)
PLATELET # BLD AUTO: 82 K/UL — LOW (ref 150–400)
POTASSIUM SERPL-MCNC: 3.4 MMOL/L — LOW (ref 3.5–5.3)
POTASSIUM SERPL-SCNC: 3.4 MMOL/L — LOW (ref 3.5–5.3)
PROT SERPL-MCNC: 4.9 GM/DL — LOW (ref 6–8.3)
RBC # BLD: 2.49 M/UL — LOW (ref 4.2–5.8)
RBC # FLD: 14.7 % — HIGH (ref 10.3–14.5)
SODIUM SERPL-SCNC: 143 MMOL/L — SIGNIFICANT CHANGE UP (ref 135–145)
SPECIMEN SOURCE: SIGNIFICANT CHANGE UP
SPECIMEN SOURCE: SIGNIFICANT CHANGE UP
WBC # BLD: 9.88 K/UL — SIGNIFICANT CHANGE UP (ref 3.8–10.5)
WBC # FLD AUTO: 9.88 K/UL — SIGNIFICANT CHANGE UP (ref 3.8–10.5)

## 2022-03-22 PROCEDURE — 99291 CRITICAL CARE FIRST HOUR: CPT

## 2022-03-22 RX ORDER — MAGNESIUM SULFATE 500 MG/ML
2 VIAL (ML) INJECTION ONCE
Refills: 0 | Status: COMPLETED | OUTPATIENT
Start: 2022-03-22 | End: 2022-03-22

## 2022-03-22 RX ORDER — POTASSIUM PHOSPHATE, MONOBASIC POTASSIUM PHOSPHATE, DIBASIC 236; 224 MG/ML; MG/ML
30 INJECTION, SOLUTION INTRAVENOUS ONCE
Refills: 0 | Status: COMPLETED | OUTPATIENT
Start: 2022-03-22 | End: 2022-03-22

## 2022-03-22 RX ORDER — LEVETIRACETAM 250 MG/1
1500 TABLET, FILM COATED ORAL
Refills: 0 | Status: DISCONTINUED | OUTPATIENT
Start: 2022-03-22 | End: 2022-03-25

## 2022-03-22 RX ADMIN — Medication 25 GRAM(S): at 05:49

## 2022-03-22 RX ADMIN — LEVETIRACETAM 1500 MILLIGRAM(S): 250 TABLET, FILM COATED ORAL at 21:23

## 2022-03-22 RX ADMIN — CHLORHEXIDINE GLUCONATE 1 APPLICATION(S): 213 SOLUTION TOPICAL at 11:24

## 2022-03-22 RX ADMIN — Medication 65 MILLIGRAM(S): at 17:40

## 2022-03-22 RX ADMIN — Medication 100 MILLIGRAM(S): at 11:23

## 2022-03-22 RX ADMIN — Medication 65 MILLIGRAM(S): at 00:52

## 2022-03-22 RX ADMIN — PANTOPRAZOLE SODIUM 40 MILLIGRAM(S): 20 TABLET, DELAYED RELEASE ORAL at 08:13

## 2022-03-22 RX ADMIN — AMLODIPINE BESYLATE 10 MILLIGRAM(S): 2.5 TABLET ORAL at 05:49

## 2022-03-22 RX ADMIN — Medication 65 MILLIGRAM(S): at 05:49

## 2022-03-22 RX ADMIN — POTASSIUM PHOSPHATE, MONOBASIC POTASSIUM PHOSPHATE, DIBASIC 83.33 MILLIMOLE(S): 236; 224 INJECTION, SOLUTION INTRAVENOUS at 05:49

## 2022-03-22 RX ADMIN — Medication 1 TABLET(S): at 11:24

## 2022-03-22 RX ADMIN — ENOXAPARIN SODIUM 40 MILLIGRAM(S): 100 INJECTION SUBCUTANEOUS at 09:06

## 2022-03-22 RX ADMIN — Medication 1: at 11:21

## 2022-03-22 RX ADMIN — Medication 1 MILLIGRAM(S): at 11:24

## 2022-03-22 RX ADMIN — Medication 25 MICROGRAM(S): at 05:49

## 2022-03-22 RX ADMIN — LEVETIRACETAM 1500 MILLIGRAM(S): 250 TABLET, FILM COATED ORAL at 09:05

## 2022-03-22 RX ADMIN — PIPERACILLIN AND TAZOBACTAM 200 GRAM(S): 4; .5 INJECTION, POWDER, LYOPHILIZED, FOR SOLUTION INTRAVENOUS at 05:49

## 2022-03-22 RX ADMIN — PIPERACILLIN AND TAZOBACTAM 200 GRAM(S): 4; .5 INJECTION, POWDER, LYOPHILIZED, FOR SOLUTION INTRAVENOUS at 21:23

## 2022-03-22 RX ADMIN — Medication 500 MILLIGRAM(S): at 11:24

## 2022-03-22 RX ADMIN — PIPERACILLIN AND TAZOBACTAM 200 GRAM(S): 4; .5 INJECTION, POWDER, LYOPHILIZED, FOR SOLUTION INTRAVENOUS at 17:40

## 2022-03-22 RX ADMIN — LACTULOSE 10 GRAM(S): 10 SOLUTION ORAL at 11:23

## 2022-03-22 NOTE — PROGRESS NOTE ADULT - SUBJECTIVE AND OBJECTIVE BOX
Patient is a 41y old  Male who presents with a chief complaint of S/P Cardiac Arrest, Hypoglycemia, JHONATAN, metabolic acidosis, Transaminitis (19 Mar 2022 09:24)      Interval History: Stable endocrine wise.  Currently on 25 MCG of levothyroxine.  Blood glucose is in normal range    MEDICATIONS  (STANDING):  amLODIPine   Tablet 10 milliGRAM(s) Oral daily  ascorbic acid 500 milliGRAM(s) Oral daily  dextrose 40% Gel 15 Gram(s) Oral once  dextrose 5%. 1000 milliLiter(s) (50 mL/Hr) IV Continuous <Continuous>  dextrose 50% Injectable 25 Gram(s) IV Push once  dextrose 50% Injectable 12.5 Gram(s) IV Push once  dextrose 50% Injectable 25 Gram(s) IV Push once  enoxaparin Injectable 40 milliGRAM(s) SubCutaneous every 24 hours  folic acid 1 milliGRAM(s) Oral daily  glucagon  Injectable 1 milliGRAM(s) IntraMuscular once  insulin lispro (ADMELOG) corrective regimen sliding scale   SubCutaneous three times a day before meals  lactulose Syrup 10 Gram(s) Oral daily  levETIRAcetam  Solution 1500 milliGRAM(s) Oral two times a day  levothyroxine 25 MICROGram(s) Oral daily  multivitamin 1 Tablet(s) Oral daily  pantoprazole    Tablet 40 milliGRAM(s) Oral before breakfast  PHENobarbital Injectable   IV Push   PHENobarbital Injectable 65 milliGRAM(s) IV Push every 12 hours  rifAXIMin 550 milliGRAM(s) Oral two times a day  thiamine 100 milliGRAM(s) Oral daily    MEDICATIONS  (PRN):  PHENobarbital Injectable 65 milliGRAM(s) IV Push every 2 hours PRN CIWA >8      Allergies    Allergy Status Unknown    Intolerances        REVIEW OF SYSTEMS:  CONSTITUTIONAL: no changes  EYES: No eye pain, visual disturbances, or discharge  ENMT:  No difficulty hearing, No sinus or throat pain  NECK: No pain or stiffness  RESPIRATORY: No cough, wheezing, chills or hemoptysis; No shortness of breath  CARDIOVASCULAR: No chest pain, palpitations or leg swelling  GASTROINTESTINAL: No abdominal or epigastric pain. No nausea, vomiting, or hematemesis; No diarrhea or constipation. No melena or hematochezia.  GENITOURINARY: No dysuria, frequency, hematuria, or incontinence  NEUROLOGICAL: No headaches, memory loss, loss of strength, numbness, or tremors  SKIN: No itching, burning, rashes, or lesions   ENDOCRINE: No heat or cold intolerance; No hair loss  MUSCULOSKELETAL: No joint pain or swelling; No muscle, back, or extremity pain  PSYCHIATRIC: No depression, anxiety, mood swings, or difficulty sleeping  HEME/LYMPH: No easy bruising, or bleeding gums  ALLERY AND IMMUNOLOGIC: No hives or eczema    Vital Signs Last 24 Hrs  T(C): 37.6 (22 Mar 2022 22:22), Max: 37.8 (22 Mar 2022 06:00)  T(F): 99.6 (22 Mar 2022 22:22), Max: 100.1 (22 Mar 2022 06:00)  HR: 90 (22 Mar 2022 22:22) (90 - 117)  BP: 135/92 (22 Mar 2022 22:22) (121/99 - 149/97)  BP(mean): 108 (22 Mar 2022 21:00) (89 - 117)  RR: 16 (22 Mar 2022 22:22) (16 - 29)  SpO2: 99% (22 Mar 2022 22:22) (71% - 100%)    PHYSICAL EXAM:  GENERAL:   HEAD: Atraumatic, Normocephalic  EYES: PERRLA, conjunctiva and sclera clear  ENMT: No  exudates,; Moist mucous membranes,, No lesions  NECK: Supple, No JVD, Normal thyroid  NERVOUS SYSTEM:  Alert & Oriented,   CHEST/LUNG: Clear to auscultation bilaterally; No rales, rhonchi, wheezing, or rubs  HEART: Regular rate and rhythm; No murmurs, rubs, or gallops  ABDOMEN: Soft, Nontender, Nondistended; Bowel sounds present  EXTREMITIES:  2+ Peripheral Pulses, no edema  SKIN: No rashes or lesions    LABS:        CAPILLARY BLOOD GLUCOSE      POCT Blood Glucose.: 176 mg/dL (22 Mar 2022 21:55)  POCT Blood Glucose.: 131 mg/dL (22 Mar 2022 15:54)  POCT Blood Glucose.: 185 mg/dL (22 Mar 2022 10:33)  POCT Blood Glucose.: 148 mg/dL (22 Mar 2022 07:21)  POCT Blood Glucose.: 117 mg/dL (22 Mar 2022 01:16)    Lipid panel:           Thyroid:  Diabetes Tests:  Parathyroid Panel:  Adrenals:  RADIOLOGY & ADDITIONAL TESTS:    Imaging Personally Reviewed:  [ ] YES  [ ] NO    Consultant(s) Notes Reviewed:  [ ] YES  [ ] NO    Care Discussed with Consultants/Other Providers [ ] YES  [ ] NO

## 2022-03-22 NOTE — PROGRESS NOTE ADULT - ASSESSMENT
41M w/ DM, hypothyroidism, seizure disorder, h/o craniotomy (unsure why), EtOH abuse, pancreatitis. PT found down and unresponsive. FS in 20s and then had witnessed cardiac arrest en route w/ ROSC after 10 minutes. Admitted to ICU for post-arrest management. Pt extubated on 3/19 and now w/ concerns for EtOH withdrawal.     #Neuro - on phenobarb for EtOH withdrawal, received 1 PRN phenobarb overnight; somewhat improved mental status, unclear if current withdrawal or as result of cerebral hypoxia, will continue to monitor for now; continue w/ keppra, increase to home dose  #CV - continue w/ amlodipine for elevated BP, continue to monitor for now   #Pulm - satting well on RA  #ID- on zosyn for likely aspiration, all cx NGTD; low grade temp improved, improved WBC count and decreasing procal; today is day 7 of zosyn, d/c after today's dose and will monitor   #Renal/metabolic - normal renal function; hypoK, hypophos and hypoMg repleted; off D5 since hyperNa resolved  #GI- able to take some PO while being fed, speech and swallow pending; continue w/ protonix; elevated LFTs likely in setting of cardiac arrest and alcoholic hepatitis, RUQ US showing cirrhosis, continue to monitor; ammonia normalized, continue w/ rifaxamin and lactulose  #Heme - chronic thrombocytopenia, improving  #Endo - no further episodes of hypoglycemia and normal hgba1c, monitor FS w/ ISS; continue w/ levothyroxine  #PPx - continue w/ lovenox for DVT ppx  #Dispo- monitor in ICU today for continued withdrawal and encephalopathy; prognosis guarded; full code

## 2022-03-22 NOTE — PHYSICAL THERAPY INITIAL EVALUATION ADULT - IMPAIRMENTS FOUND, PT EVAL
arousal, attention, and cognition/cognitive impairment/decreased midline orientation/ergonomics and body mechanics/joint integrity and mobility/muscle strength/neuromotor development and sensory integration/posture

## 2022-03-22 NOTE — PROGRESS NOTE ADULT - SUBJECTIVE AND OBJECTIVE BOX
CHIEF COMPLAINT:    Interval Events:  d/c D5W as Na has normalized  received 1 additional dose of phenobarb at 12 AM    REVIEW OF SYSTEMS:  [x ] Unable to assess ROS because encephalopathic    OBJECTIVE:  ICU Vital Signs Last 24 Hrs  T(C): 37.8 (22 Mar 2022 06:00), Max: 38.1 (21 Mar 2022 12:00)  T(F): 100.1 (22 Mar 2022 06:00), Max: 100.5 (21 Mar 2022 12:00)  HR: 106 (22 Mar 2022 09:00) (100 - 120)  BP: 132/104 (22 Mar 2022 09:00) (124/90 - 149/97)  BP(mean): 110 (22 Mar 2022 09:00) (98 - 117)  ABP: --  ABP(mean): --  RR: 21 (22 Mar 2022 09:00) (14 - 33)  SpO2: 99% (22 Mar 2022 08:00) (71% - 100%)        03-21 @ 07:01  -  03-22 @ 07:00  --------------------------------------------------------  IN: 1450 mL / OUT: 0 mL / NET: 1450 mL    03-22 @ 07:01  -  03-22 @ 10:16  --------------------------------------------------------  IN: 0 mL / OUT: 0 mL / NET: 0 mL      CAPILLARY BLOOD GLUCOSE      POCT Blood Glucose.: 148 mg/dL (22 Mar 2022 07:21)      PHYSICAL EXAM:  General: NAD, non toxic appearing  HEENT: dry MM, scleral icterus, EOMI  Neck: supple  Respiratory: CTA b/l  Cardiovascular: s1s2 RRR  Abdomen: soft, non tender, non distended  Extremities: warm, no edema or clubbing  Skin: intact  Neurological: moving all extremities  Psychiatry: answers to name, difficulty focusing, does not know where he is    LINES:  [facility] [place]    HOSPITAL MEDICATIONS:  MEDICATIONS  (STANDING):  amLODIPine   Tablet 10 milliGRAM(s) Oral daily  ascorbic acid 500 milliGRAM(s) Oral daily  chlorhexidine 2% Cloths 1 Application(s) Topical daily  dextrose 40% Gel 15 Gram(s) Oral once  dextrose 5%. 1000 milliLiter(s) (50 mL/Hr) IV Continuous <Continuous>  dextrose 5%. 1000 milliLiter(s) (100 mL/Hr) IV Continuous <Continuous>  dextrose 50% Injectable 25 Gram(s) IV Push once  dextrose 50% Injectable 12.5 Gram(s) IV Push once  dextrose 50% Injectable 25 Gram(s) IV Push once  enoxaparin Injectable 40 milliGRAM(s) SubCutaneous every 24 hours  folic acid 1 milliGRAM(s) Oral daily  glucagon  Injectable 1 milliGRAM(s) IntraMuscular once  insulin lispro (ADMELOG) corrective regimen sliding scale   SubCutaneous three times a day before meals  lactulose Syrup 10 Gram(s) Oral daily  levETIRAcetam  Solution 1500 milliGRAM(s) Oral two times a day  levothyroxine 25 MICROGram(s) Oral daily  multivitamin 1 Tablet(s) Oral daily  pantoprazole    Tablet 40 milliGRAM(s) Oral before breakfast  PHENobarbital Injectable   IV Push   PHENobarbital Injectable 65 milliGRAM(s) IV Push every 12 hours  piperacillin/tazobactam IVPB... 3.375 Gram(s) IV Intermittent every 8 hours  rifAXIMin 550 milliGRAM(s) Oral two times a day  thiamine 100 milliGRAM(s) Oral daily    MEDICATIONS  (PRN):  PHENobarbital Injectable 65 milliGRAM(s) IV Push every 2 hours PRN CIWA >8      LABS:                        9.0    9.88  )-----------( 82       ( 22 Mar 2022 03:56 )             25.9     Hgb Trend: 9.0<--, 9.9<--, 8.2<--, 8.8<--, 8.9<--  03-22    143  |  108  |  8   ----------------------------<  127<H>  3.4<L>   |  26  |  0.70    Ca    8.3<L>      22 Mar 2022 03:56  Phos  2.0     03-22  Mg     1.6     03-22    TPro  4.9<L>  /  Alb  2.4<L>  /  TBili  4.6<H>  /  DBili  x   /  AST  227<H>  /  ALT  150<H>  /  AlkPhos  223<H>  03-22              MICROBIOLOGY:     RADIOLOGY:  [ ] Reviewed and interpreted by me

## 2022-03-22 NOTE — PHARMACOTHERAPY INTERVENTION NOTE - COMMENTS
Recommended adjustment of levetiracetam  mg q12h to 1500 mg q12h based on home dose (renal function now normalized).
Per policy, levetiracetam IV 1500 mg q12h transitioned to oral solution as patient is tolerating other medications/food by mouth.
Recommended reinitiation of home levetiracetam renally dosed at 750 mg IV q12h.

## 2022-03-22 NOTE — PHYSICAL THERAPY INITIAL EVALUATION ADULT - NS ASR RISK AREAS PT EVAL
Mirvaso Pregnancy And Lactation Text: This medication has not been assigned a Pregnancy Risk Category. It is unknown if the medication is excreted in breast milk. fall/impaired judgment/cognitive impairment

## 2022-03-22 NOTE — PHYSICAL THERAPY INITIAL EVALUATION ADULT - PATIENT/FAMILY/SIGNIFICANT OTHER GOALS STATEMENT, PT EVAL
Per wife contacted on phone (Mrs. Berg, emergency contact number) - to enable safe mobility as before.

## 2022-03-22 NOTE — CHART NOTE - NSCHARTNOTEFT_GEN_A_CORE
This is 41 year old man with a PMHx of seizure (on keppra). Family called EMS after finding pt unresponsive in his bed. On arrival EMS reports pt was spontaneously breathing and had a pulse but was only responsive to pain. Patient's finger stick was noted to be 23. Per EMS, pt went into witnessed arrest shortly there after. ACLS immediately initiated, ROSC achieved after about 5-10 mins. No shocks given. On arrival to ED, pt in sinus tachy, glucose in 200s, hypotensive to 70s systolic. Levophed drip was started. Patient started biting on the ETT and fentanyl drip was ordered. Labs showed K: 3.4, Cl: 87, gap: 43, BUN: 33, Creat: 3.6, T Bili: 4.8, AST/ALT elevated, L: 18.9, TSH: 6.9, pH: 7.0, pCo2: 25, HCO3: 7. CT Head done reported no acute intracranial hemorrhage or mass effect and no evidence of diffuse global anoxic injury on head CT. Patient was admitted to the ICU for management of acute hypoxic respiratory failure, post cardiac arrest, encephalopathy, JHONATAN, transaminitis, hypoglycemia, lactic acidosis, and severe metabolic acidosis. Endocrinology was consulted. On 03/19, patient was extubated. Post extubation, patient was confused/ disoriented, tachycardic, hand tremors. There were concern for ETOH withdrawal. Phenobarbital tapering was started. EEG shows Increased risk of focal seizures with onset in the right temporal region or left frontotemporal region. Patient was restarted back to his home dose Keppra after renal function returned to normal. 03/16/22, patient was noted to have a fever. CXR shows new bibasilar airspace infiltrates most compatible with aspiration pneumonia. Blood culture and procal was sent. Zoysn was continued. Today, patient is hemodynamically stable. Patient had no acute event overnight. Patient has remained on room sat on 100% s/p extubation. Continue patient's phenobarbital taper, amlodipine for HTN, Keppra for sz, synthorid for hypothyroidism, and lactulose along with Rifaximin for encephalopathy. Continue Zosyn and follow up with blood culture. Trend CBC and CMP. Replete electrolytes if needed. Patient is stable to be transferred to the medicine floor. This is 41 year old man with a PMHx of seizure (on keppra). Family called EMS after finding pt unresponsive in his bed. On arrival EMS reports pt was spontaneously breathing and had a pulse but was only responsive to pain. Patient's finger stick was noted to be 23. Per EMS, pt went into witnessed arrest shortly there after. ACLS immediately initiated, ROSC achieved after about 5-10 mins. No shocks given. On arrival to ED, pt in sinus tachy, glucose in 200s, hypotensive to 70s systolic. Levophed drip was started. Patient started biting on the ETT and fentanyl drip was ordered. Labs showed K: 3.4, Cl: 87, gap: 43, BUN: 33, Creat: 3.6, T Bili: 4.8, AST/ALT elevated, L: 18.9, TSH: 6.9, pH: 7.0, pCo2: 25, HCO3: 7. CT Head done reported no acute intracranial hemorrhage or mass effect and no evidence of diffuse global anoxic injury on head CT. Patient was admitted to the ICU for management of acute hypoxic respiratory failure, post cardiac arrest, encephalopathy, JHONATAN, transaminitis, hypoglycemia, lactic acidosis, and severe metabolic acidosis. Endocrinology was consulted. On 03/19, patient was extubated. Post extubation, patient was confused/ disoriented, tachycardic, hand tremors. There were concern for ETOH withdrawal. Phenobarbital tapering was started with improvement in CIWA score. EEG shows Increased risk of focal seizures with onset in the right temporal region or left frontotemporal region. Patient was restarted back to his home dose Keppra after renal function returned to normal. 03/16/22, patient was noted to have a fever. CXR shows new bibasilar airspace infiltrates most compatible with aspiration pneumonia. Blood culture pending and procal decreasing. Zoysn for a total of 7 days. Today, patient is hemodynamically stable. Patient had no acute event overnight. Patient has remained on room sat on 100% s/p extubation. Continue patient's phenobarbital taper, amlodipine for HTN, Keppra for sz, synthroid for hypothyroidism, and lactulose along with Rifaximin for encephalopathy. Ammonia normalized. AM level ordered. LFTs still elevated, continue to monitor. Follow up blood culture. Trend CBC and CMP. Replete electrolytes if needed. Patient able to take some PO while being fed, speech and swallow pending.       Addendum: 8:30 pm  Patient was seen and examined and is hemodynamically stable for transfer to telemetry. ICU attending aware.   Signed out to Dr. Cochran. This is 41 year old man with a PMHx of seizure (on keppra). Family called EMS after finding pt unresponsive in his bed. On arrival EMS reports pt was spontaneously breathing and had a pulse but was only responsive to pain. Patient's finger stick was noted to be 23. Per EMS, pt went into witnessed arrest shortly there after. ACLS immediately initiated, ROSC achieved after about 5-10 mins. No shocks given. On arrival to ED, pt in sinus tachy, glucose in 200s, hypotensive to 70s systolic. Levophed drip was started. Patient started biting on the ETT and fentanyl drip was ordered. Labs showed K: 3.4, Cl: 87, gap: 43, BUN: 33, Creat: 3.6, T Bili: 4.8, AST/ALT elevated, L: 18.9, TSH: 6.9, pH: 7.0, pCo2: 25, HCO3: 7. CT Head done reported no acute intracranial hemorrhage or mass effect and no evidence of diffuse global anoxic injury on head CT. Patient was admitted to the ICU for management of acute hypoxic respiratory failure, post cardiac arrest, encephalopathy, JHONATAN, transaminitis, hypoglycemia, lactic acidosis, and severe metabolic acidosis. Endocrinology was consulted. On 03/19, patient was extubated. Post extubation, patient was confused/ disoriented, tachycardic, hand tremors. There were concern for ETOH withdrawal. Phenobarbital tapering was started with improvement in CIWA score. EEG shows Increased risk of focal seizures with onset in the right temporal region or left frontotemporal region. Patient was restarted back to his home dose Keppra after renal function returned to normal. 03/16/22, patient was noted to have a fever. CXR shows new bibasilar airspace infiltrates most compatible with aspiration pneumonia. Blood culture pending and procal decreasing. Zoysn for a total of 7 days. Today, patient is hemodynamically stable. Patient had no acute event overnight. Patient has remained on room sat on 100% s/p extubation. Continue patient's phenobarbital taper, amlodipine for HTN, Keppra for sz, synthroid for hypothyroidism, and lactulose along with Rifaximin for encephalopathy. Ammonia normalized. AM level ordered. LFTs still elevated, continue to monitor. Follow up blood culture. Trend CBC and CMP. Replete electrolytes if needed. Patient able to take some PO while being fed, speech and swallow pending.       Addendum: 8:30 pm  Patient was seen and examined and is hemodynamically stable for transfer to telemetry. ICU attending aware.   Signed out to Dr. Swartz.

## 2022-03-22 NOTE — PROGRESS NOTE ADULT - CRITICAL CARE SERVICES PROVIDED
Critical care services provided
Patient is critically ill, requiring critical care services.
Critical care services provided

## 2022-03-22 NOTE — PHYSICAL THERAPY INITIAL EVALUATION ADULT - POSTURAL RE-EDUCATION, PT EVAL
GOAL: Patient will demonstrate independent postural correction for better biomechanics during functional tasks, to prevent injury in 4 weeks.

## 2022-03-22 NOTE — PHYSICAL THERAPY INITIAL EVALUATION ADULT - FOLLOWS COMMANDS/ANSWERS QUESTIONS, REHAB EVAL
[FreeTextEntry1] :  ECG: Normal sinus rhythm at 61 with minor nonspecific T wave abnormalities\par \par Laboratory data \par 9/16/19:\par Creatinine 1.5\par \par 8/6/20 creatinine 1.6\par \par Hemoglobin 14.1\par \par 6/17/2019\par Chol 115\par Tri. 270\par HDL 29\par LDL 32\par \par Creat. 1.54 ( baseline)\par K 4.3\par HGB 15.2\par \par \par Echocardiogram 7/26/19\par EF 55-60%\par Mod. aortic dilation measuring at 3.8 cm\par Inferior hypokinesis\par \par Cardiac catheterization 9/26/18:\par Left main no significant disease\par LAD 30% in-stent stenosis\par Diagonal one small disease\par Diagonal 2 tubular 85% stenosis STENTED\par Circumflex 40% stenosis\par marginal mild disease\par Right coronary artery large and dominant with ectasia 30% distal stenosis.\par \par A CT of the chest that showed 4.1 cm ascending aortic aneurysm. Thickening of the esophagus.\par An ultrasound demonstrated a previously known right renal mass of greater than 5 cm.\par \par Impression:\par \par 1. PETERSON which appears unchanged from baseline\par    Became exacerbated during the course of his urinary tract infection, lumbar, with treatment of that those symptoms resolved.\par \par 2. Rt hip pain limits ambulation.\par \par 3. Chest pain which has resolved on Ranexa and Imdur\par \par 3 History of renal insufficiency with a stable creatinine 1.6, 5/3/2019 1.7, Would avoid repeat cath is possible     given degree of CKD.\par \par 4 Right Renal cell tumor which the patient prefers not to have addressed medically.\par \par 5 chronic dyspnea of uncertain etiology. Cardiac anatomy and left ventricular function and did not seem to explain   it.\par 6. Lipids controlled\par \par 7. Blood pressure controlled\par \par 8. Mild ascending aorta aneurysm 4.1 cm \par     Echocardiogram revealing stable mild dilation of ascending aorta and hypokinesis of the inferior basal wall             which is old. \par 9. less edema with increased HCTZ\par \par Plan:\par 1.   Continue to F/U with PCP as scheduled and have all blood work faxed to our office.\par \par 2.   Contact our office immediately with worsening shortness of breath or recurrent chest pain.\par \par \par At this time no further cardiac testing is needed. F/u 4 months\par  speech unintelligible

## 2022-03-22 NOTE — PHYSICAL THERAPY INITIAL EVALUATION ADULT - ADDITIONAL COMMENTS
Per patient's wife, lives in home c about 18 stairs steps c railings to hold. Once inside, all amenities at same level. Lives c girlfriend, whom assist with ADLs. Reports increasingly had difficulty ambulating and has had multiple falls. Reports newly diagnosed with diabetes and is poorly controlled. Has been to MD and was told nerve problems with feet (?neuropathy). Before mobility issues, was independent in all mobility without device. Denies use of home O2. Craniotomy in 2015 at [facility] to manage seizures--but was cognitively functional after which.

## 2022-03-22 NOTE — PHYSICAL THERAPY INITIAL EVALUATION ADULT - PERTINENT HX OF CURRENT PROBLEM, REHAB EVAL
3/16 found by wife supine in bed, incomprehensible speech. EMS called. Found c finger stick of 23. On transfer to [facility], noted to be going into cardiac arrest. x1 Epinephrine given. ROSC 10 minutes. No shocks given. Intubated on scene. In ED, in sinus tachycarida, hyperglycemia, hypotensed at sBP 70s. Levophed initiated. End tidal CO2 ~30. Extubated on 3/19. Now on CIWA 5, RASS +1. Required phenobarbital due to agitation and confusion.

## 2022-03-23 LAB
ALBUMIN SERPL ELPH-MCNC: 2.5 G/DL — LOW (ref 3.3–5)
ALP SERPL-CCNC: 225 U/L — HIGH (ref 40–120)
ALT FLD-CCNC: 133 U/L — HIGH (ref 12–78)
ANION GAP SERPL CALC-SCNC: 9 MMOL/L — SIGNIFICANT CHANGE UP (ref 5–17)
AST SERPL-CCNC: 154 U/L — HIGH (ref 15–37)
BILIRUB SERPL-MCNC: 4.1 MG/DL — HIGH (ref 0.2–1.2)
BUN SERPL-MCNC: 7 MG/DL — SIGNIFICANT CHANGE UP (ref 7–23)
CALCIUM SERPL-MCNC: 8.1 MG/DL — LOW (ref 8.5–10.1)
CHLORIDE SERPL-SCNC: 110 MMOL/L — HIGH (ref 96–108)
CO2 SERPL-SCNC: 25 MMOL/L — SIGNIFICANT CHANGE UP (ref 22–31)
CREAT SERPL-MCNC: 0.66 MG/DL — SIGNIFICANT CHANGE UP (ref 0.5–1.3)
EGFR: 121 ML/MIN/1.73M2 — SIGNIFICANT CHANGE UP
FLUAV AG NPH QL: SIGNIFICANT CHANGE UP
FLUBV AG NPH QL: SIGNIFICANT CHANGE UP
FOLATE SERPL-MCNC: 12.6 NG/ML — SIGNIFICANT CHANGE UP
GLUCOSE BLDC GLUCOMTR-MCNC: 127 MG/DL — HIGH (ref 70–99)
GLUCOSE BLDC GLUCOMTR-MCNC: 137 MG/DL — HIGH (ref 70–99)
GLUCOSE BLDC GLUCOMTR-MCNC: 142 MG/DL — HIGH (ref 70–99)
GLUCOSE BLDC GLUCOMTR-MCNC: 144 MG/DL — HIGH (ref 70–99)
GLUCOSE SERPL-MCNC: 130 MG/DL — HIGH (ref 70–99)
HCT VFR BLD CALC: 27.8 % — LOW (ref 39–50)
HGB BLD-MCNC: 9.4 G/DL — LOW (ref 13–17)
MAGNESIUM SERPL-MCNC: 1.6 MG/DL — SIGNIFICANT CHANGE UP (ref 1.6–2.6)
MCHC RBC-ENTMCNC: 33.8 G/DL — SIGNIFICANT CHANGE UP (ref 32–36)
MCHC RBC-ENTMCNC: 34.7 PG — HIGH (ref 27–34)
MCV RBC AUTO: 102.6 FL — HIGH (ref 80–100)
NRBC # BLD: 1 /100 WBCS — HIGH (ref 0–0)
PHOSPHATE SERPL-MCNC: 2.4 MG/DL — LOW (ref 2.5–4.5)
PLATELET # BLD AUTO: 150 K/UL — SIGNIFICANT CHANGE UP (ref 150–400)
POTASSIUM SERPL-MCNC: 3.5 MMOL/L — SIGNIFICANT CHANGE UP (ref 3.5–5.3)
POTASSIUM SERPL-SCNC: 3.5 MMOL/L — SIGNIFICANT CHANGE UP (ref 3.5–5.3)
PROT SERPL-MCNC: 5.2 GM/DL — LOW (ref 6–8.3)
RBC # BLD: 2.71 M/UL — LOW (ref 4.2–5.8)
RBC # FLD: 14.5 % — SIGNIFICANT CHANGE UP (ref 10.3–14.5)
SARS-COV-2 RNA SPEC QL NAA+PROBE: SIGNIFICANT CHANGE UP
SODIUM SERPL-SCNC: 144 MMOL/L — SIGNIFICANT CHANGE UP (ref 135–145)
VIT B12 SERPL-MCNC: 1690 PG/ML — HIGH (ref 232–1245)
WBC # BLD: 9.87 K/UL — SIGNIFICANT CHANGE UP (ref 3.8–10.5)
WBC # FLD AUTO: 9.87 K/UL — SIGNIFICANT CHANGE UP (ref 3.8–10.5)

## 2022-03-23 PROCEDURE — 99233 SBSQ HOSP IP/OBS HIGH 50: CPT

## 2022-03-23 RX ORDER — ACETAMINOPHEN 500 MG
650 TABLET ORAL ONCE
Refills: 0 | Status: COMPLETED | OUTPATIENT
Start: 2022-03-23 | End: 2022-03-23

## 2022-03-23 RX ORDER — CHLORHEXIDINE GLUCONATE 213 G/1000ML
1 SOLUTION TOPICAL DAILY
Refills: 0 | Status: DISCONTINUED | OUTPATIENT
Start: 2022-03-23 | End: 2022-03-25

## 2022-03-23 RX ORDER — SODIUM,POTASSIUM PHOSPHATES 278-250MG
1 POWDER IN PACKET (EA) ORAL THREE TIMES A DAY
Refills: 0 | Status: COMPLETED | OUTPATIENT
Start: 2022-03-23 | End: 2022-03-25

## 2022-03-23 RX ADMIN — CHLORHEXIDINE GLUCONATE 1 APPLICATION(S): 213 SOLUTION TOPICAL at 12:17

## 2022-03-23 RX ADMIN — Medication 1 MILLIGRAM(S): at 12:09

## 2022-03-23 RX ADMIN — LEVETIRACETAM 1500 MILLIGRAM(S): 250 TABLET, FILM COATED ORAL at 23:04

## 2022-03-23 RX ADMIN — Medication 1 PACKET(S): at 23:05

## 2022-03-23 RX ADMIN — PANTOPRAZOLE SODIUM 40 MILLIGRAM(S): 20 TABLET, DELAYED RELEASE ORAL at 05:53

## 2022-03-23 RX ADMIN — Medication 1 PACKET(S): at 17:48

## 2022-03-23 RX ADMIN — Medication 100 MILLIGRAM(S): at 12:09

## 2022-03-23 RX ADMIN — Medication 500 MILLIGRAM(S): at 12:09

## 2022-03-23 RX ADMIN — AMLODIPINE BESYLATE 10 MILLIGRAM(S): 2.5 TABLET ORAL at 05:55

## 2022-03-23 RX ADMIN — Medication 650 MILLIGRAM(S): at 17:47

## 2022-03-23 RX ADMIN — LACTULOSE 10 GRAM(S): 10 SOLUTION ORAL at 12:09

## 2022-03-23 RX ADMIN — Medication 65 MILLIGRAM(S): at 06:11

## 2022-03-23 RX ADMIN — LEVETIRACETAM 1500 MILLIGRAM(S): 250 TABLET, FILM COATED ORAL at 09:06

## 2022-03-23 RX ADMIN — Medication 650 MILLIGRAM(S): at 23:03

## 2022-03-23 RX ADMIN — ENOXAPARIN SODIUM 40 MILLIGRAM(S): 100 INJECTION SUBCUTANEOUS at 09:06

## 2022-03-23 RX ADMIN — Medication 1 TABLET(S): at 12:09

## 2022-03-23 RX ADMIN — Medication 650 MILLIGRAM(S): at 18:00

## 2022-03-23 RX ADMIN — Medication 25 MICROGRAM(S): at 05:55

## 2022-03-23 NOTE — CONSULT NOTE ADULT - SUBJECTIVE AND OBJECTIVE BOX
Patient is a 41y old  Male who presents with a chief complaint of cardiac arrest, respiratory failure (18 Mar 2022 11:42)      Reason For Consult: Hypothyroidism     HPI:    * History obtained from chart review and ED physician as pt is currently intubated and no family available at this time.     41 year old man with a PMHx of seizure (on keppra). Family called EMS after finding pt unresponsive in his bed. On arrival EMS reports pt was spontaneously breathing and had a pulse but was only responsive to pain. Finger stick was 23. Per EMS, pt went into witnessed arrest shortly there after, ACLS immediately initiated, ROSC achieved after about 5-10 mins. No shocks given. On arrival to ED pt in sinus tachy, glucose in 200s, hypotensive to 70s systolic. Levophed drip was started. Patient started biting on the ETT and fentanyl drip was ordered. Labs showed K: 3.4, Cl: 87, gap: 43, BUN: 33, Creat: 3.6, T Bili: 4.8, AST/ALT elevated, L: 18.9, TSH: 6.9, pH: 7.0, pCo2: 25, HCO3: 7. CT Head done reported no acute intracranial hemorrhage or mass effect and no evidence of diffuse global anoxic injury on head CT.    Patient admitted to the ICU for management of acute hypoxic respiratory failure, post cardiac arrest, encephalopathy, JHONATAN, transaminitis, hypoglycemia, lactic acidosis, and severe metabolic acidosis.      (16 Mar 2022 22:33)  Patient currently not on any thyroid medication prior to this admission.  TSH was 6.9 and he has been started on 25 MCG of levothyroxine.  Fingersticks were slightly increased but currently they are in the 100s and patient is not on any diabetic medications    PAST MEDICAL & SURGICAL HISTORY:  No pertinent past medical history        FAMILY HISTORY:        Social History:    MEDICATIONS  (STANDING):  ascorbic acid 500 milliGRAM(s) Oral daily  chlorhexidine 0.12% Liquid 15 milliLiter(s) Oral Mucosa every 12 hours  chlorhexidine 2% Cloths 1 Application(s) Topical daily  dexMEDEtomidine Infusion 0.7 MICROgram(s)/kG/Hr (9.56 mL/Hr) IV Continuous <Continuous>  dextrose 5% + lactated ringers. 1000 milliLiter(s) (50 mL/Hr) IV Continuous <Continuous>  fentaNYL   Infusion. 3 MICROgram(s)/kG/Hr (21 mL/Hr) IV Continuous <Continuous>  folic acid Injectable 1 milliGRAM(s) IV Push daily  lactulose Syrup 10 Gram(s) Oral every 12 hours  levETIRAcetam  IVPB 750 milliGRAM(s) IV Intermittent every 12 hours  levothyroxine 25 MICROGram(s) Oral daily  midodrine 10 milliGRAM(s) Oral every 8 hours  multivitamin 1 Tablet(s) Oral daily  norepinephrine Infusion 0.05 MICROgram(s)/kG/Min (6.56 mL/Hr) IV Continuous <Continuous>  pantoprazole  Injectable 40 milliGRAM(s) IV Push daily  petrolatum Ophthalmic Ointment 1 Application(s) Both EYES every 8 hours  piperacillin/tazobactam IVPB... 3.375 Gram(s) IV Intermittent every 8 hours  rifAXIMin 550 milliGRAM(s) Oral two times a day  thiamine Injectable 100 milliGRAM(s) IV Push daily    MEDICATIONS  (PRN):      REVIEW OF SYSTEMS:  CONSTITUTIONAL:  as per HPI  HEENT:  Eyes:  No diplopia or blurred vision.   ENT:  No earache, sore throat or runny nose.  CARDIOVASCULAR:  No chest pain .  RESPIRATORY:  No cough, shortness of breath, PND or orthopnea.  GASTROINTESTINAL:  No nausea, vomiting or diarrhea.  GENITOURINARY:  No dysuria, frequency or urgency. No Blood in urine  MUSCULOSKELETAL:  no joint aches, no muscle pain, myalgia  SKIN:  No change in skin, hair or nails.  NEUROLOGIC:  No paresthesias, fasciculations, seizures or weakness.  PSYCHIATRIC:  No disorder of thought or mood.  ENDOCRINE:  No heat or cold intolerance, polyuria or polydipsia. abnormal weight gain or loss, oral thrush  HEMATOLOGICAL:  No easy bruising or bleeding.     T(C): 37.6 (03-18-22 @ 20:00), Max: 37.6 (03-18-22 @ 20:00)  HR: 63 (03-18-22 @ 22:00) (63 - 104)  BP: 98/66 (03-18-22 @ 22:00) (81/58 - 134/93)  RR: 14 (03-18-22 @ 22:00) (0 - 21)  SpO2: 100% (03-18-22 @ 22:00) (91% - 100%)  Wt(kg): --    PHYSICAL EXAM:  GENERAL: NAD, well-groomed, well-developed  HEAD:  Atraumatic, Normocephalic  EYES: PERRLA, conjunctiva and sclera clear  ENMT: No  exudates,, Moist mucous membranes,, No lesions  NECK: Supple, No JVD,   NERVOUS SYSTEM:  Alert & Oriented   CHEST/LUNG: Clear to percussion bilaterally; No rales, rhonchi, wheezing, or rubs  HEART: Regular rate and rhythm; No murmurs, rubs, or gallops  ABDOMEN: Soft, Nontender, Nondistended; Bowel sounds present  EXTREMITIES:  2+ Peripheral Pulses, No clubbing, cyanosis, or edema  LYMPH: No lymphadenopathy noted  SKIN: No rashes or lesions    CAPILLARY BLOOD GLUCOSE      POCT Blood Glucose.: 168 mg/dL (18 Mar 2022 22:59)  POCT Blood Glucose.: 183 mg/dL (18 Mar 2022 17:31)  POCT Blood Glucose.: 173 mg/dL (18 Mar 2022 14:45)  POCT Blood Glucose.: 270 mg/dL (18 Mar 2022 11:08)  POCT Blood Glucose.: 228 mg/dL (18 Mar 2022 06:29)                            8.9    5.43  )-----------( 52       ( 18 Mar 2022 03:07 )             25.8       CMP:  03-18 @ 18:55  SGPT 139  Albumin 3.0   Alk Phos 82   Anion Gap 10   SGOT 674   Total Bili 4.5   BUN 41   Calcium Total 7.6   CO2 27   Chloride 108   Creatinine 1.52   eGFR if AA --   eGFR if non AA --   Glucose 189   Potassium 3.9   Protein 5.2   Sodium 145      Thyroid Function Tests:      Diabetes Tests:     Parathyroids:     Adrenals:       Radiology:             
HPI: 41 year old man with hx of seizure disorder, alcoholism, Hypothyroidism admitted on 3/16/22 for unresponsive and cardiac arrest for 5-10 minutes. His glucose was 23 and became hypotensive. He was admitted to ICU and transferred 3/22/22. He is being tapered off phenobarbital for possible etoh withdrawal. As per patient and his girlfriend, patient is on Keppra 1500mg BID and follows with a neurologist. He has not been compliant with his medications or follow ups. As per girlfriend he had a temporal lobectomy to control his seizures in 2015.  As per girlfriend he has not filled his medications since August 2021 but has Keppra medication at home. Not sure about date they were filled. Patient denies non-compliance.     PMHx: seizure disorder, alcoholism, Hypothyroidism  PSHx: right temporal lobectomy (11/2015)  FHx: none  Social Hx: non-smoker, 1/2 gallon of gin per day, no illicit drug use  Meds: see emr  ROS: unable to obtain due to AMS  Allergies: NKDA    Vitals: Temp 98.3    RR 17        /79  General: NAD  Neuro Exam: AOx1. States he is at home, month is October, year is 3, No dysarthria. No aphasia. EOM intact. scleral icterus noted. PERRL. VFF. Tongue is midline. Palate elevates symmetrically. Finger to nose intact. Reflexes symmetric and toes down. Shoulder shrug is intact. Moving all four extremities against gravity with generalized weakness. Gait exam deferred.     NIHSS- 2  MRS- 1    CT head, EEG, and labs reviewed  
41 year old with respiratory and cardiac arrest. Past medical history of seizure disorder with craniotomy in 2015 at [facility], Type II DM,. pancreatitis, +tobacco use, ETOH.  Patient currently ventilated and sedated without capacity.     Patient with a girlfriend and a wife, questions regarding surrogate decision maker.         Prognosis Estimate (survival in days, wks, mos, yrs): guarded  Patient Decision-Making Capacity:  [  ] Has capacity    [  X] Lacks capacity because--ventilated and sedated   Patient Aware of:  Diagnosis:  [  ] Yes   [  ] No  [ x ] Unknown    Prognosis:  [  ] Yes   [  ] No  [ x ] Unknown      Name of medical decision-maker should patient lack capacity:   UNKNOWN  at this time due to having a wife and a girlfriend  Role:  [  ] Health Care Agent     [X  ] Legal Surrogate   Contact #(s):        Nayely Lundberg [phone]  Other ‘stakeholders’:  	Bora [place], brother , girlfriend Eleni (pending contact information)  Other Decision-Maker (i.e., HCA or Surrogate) Aware of:  Diagnosis: [x  ]Yes   [  ]No      Prognosis:  [ x ] Yes    [  ] No  Evidence of Patient’s Preference of Life-Sustaining Treatment (Written or Oral):   NONE  Resuscitation status:   DNR:  [  ]Yes   [x ]No      DNI:  [  ]Yes   [ x]No     Discussions- 10:45-11:30  Ethics met with wife Tamika and brother Bora at bedside. Explained role of Ethics  and decision making.  Wife explained that she and the patient never filed separation paperwork, and he goes back and forth between the homes. She did not have any contact information regarding girlfriend.  Stated that there are minor and adult children, one 22 year old daughter from an earlier relationship who he doesn't speak with, plus the children that they have together. Said that she is unaware of any other children.  Tamika was able to recount his medical history, including the craniotomy in 2015 at [facility], she has his doctors names at home said that he never followed up and his brother Bora was able to speak about the patient's history as well and said there are other family members and we discussed having people come in.  Tamika became emotional and asked about outcome and I explained that the team is rounding and will answer all medical questions.  Emotional support provided and contact information given to both Tamika and Bora.

## 2022-03-23 NOTE — PROGRESS NOTE ADULT - SUBJECTIVE AND OBJECTIVE BOX
CHIEF COMPLAINT: Follow up of acute respiratory failure, acute metabolic encephalopathy  no report of any fever, vomiting, diarrhea. active gross bleeding  tolerating oral diet.   confusion off and on .      PHYSICAL EXAM:    GENERAL: cachectic, no acute distress   CHEST/LUNG: Few crackles right base, otherwise CTA. no wheezing   HEART: Regular rate and rhythm; tachycardic   ABDOMEN: Soft, Nontender, Nondistended; Bowel sounds present  EXTREMITIES:  Moving all four extremities spontaneously, No clubbing, cyanosis, or edema  NERVOUS SYSTEM:  Grossly non focal ( based on observation as patient actually did not follow my commands.  Psychiatry: AAO x his name but not to place or  or age.       OBJECTIVE DATA:   Vital Signs Last 24 Hrs  T(C): 36.8 (23 Mar 2022 10:57), Max: 37.6 (22 Mar 2022 22:22)  T(F): 98.3 (23 Mar 2022 10:57), Max: 99.6 (22 Mar 2022 22:22)  HR: 105 (23 Mar 2022 10:57) (90 - 105)  BP: 119/79 (23 Mar 2022 10:57) (119/79 - 142/96)  BP(mean): 108 (22 Mar 2022 21:00) (99 - 109)  RR: 17 (23 Mar 2022 10:57) (16 - 29)  SpO2: 97% (23 Mar 2022 10:57) (87% - 99%)           Daily     Daily Weight in k.8 (23 Mar 2022 05:21)  LABS:                        9.4    9.87  )-----------( 150      ( 23 Mar 2022 06:42 )             27.8                 144  |  110<H>  |  7   ----------------------------<  130<H>  3.5   |  25  |  0.66    Ca    8.1<L>      23 Mar 2022 06:42  Phos  2.4       Mg     1.6         TPro  5.2<L>  /  Alb  2.5<L>  /  TBili  4.1<H>  /  DBili  x   /  AST  154<H>  /  ALT  133<H>  /  AlkPhos  225<H>                          CAPILLARY BLOOD GLUCOSE      POCT Blood Glucose.: 142 mg/dL (23 Mar 2022 11:31)      Culture - Blood  Source: .Blood Blood  Preliminary Report ():    No growth to date.    Culture - Blood  Source: .Blood Blood  Preliminary Report ():    No growth to date.         Interval Radiology studies: reviewed by me  < from: Xray Chest 1 View- PORTABLE-Routine (Xray Chest 1 View- PORTABLE-Routine in AM.) (22 @ 07:38) >  Endotracheal tube nasogastric tube have been removed.  low lung volumes.   No change heart mediastinum. New bibasilar airspace infiltrates most   compatible with aspiration pneumonia in this clinical setting. small hazy   pleural effusions  suspect. No pneumothorax.    < end of copied text >    Tele reviewed by me showed no significant arrhythmias today.     MEDICATIONS  (STANDING):  amLODIPine   Tablet 10 milliGRAM(s) Oral daily  ascorbic acid 500 milliGRAM(s) Oral daily  chlorhexidine 2% Cloths 1 Application(s) Topical daily  dextrose 40% Gel 15 Gram(s) Oral once  dextrose 5%. 1000 milliLiter(s) (50 mL/Hr) IV Continuous <Continuous>  dextrose 50% Injectable 25 Gram(s) IV Push once  dextrose 50% Injectable 12.5 Gram(s) IV Push once  dextrose 50% Injectable 25 Gram(s) IV Push once  enoxaparin Injectable 40 milliGRAM(s) SubCutaneous every 24 hours  folic acid 1 milliGRAM(s) Oral daily  glucagon  Injectable 1 milliGRAM(s) IntraMuscular once  insulin lispro (ADMELOG) corrective regimen sliding scale   SubCutaneous three times a day before meals  lactulose Syrup 10 Gram(s) Oral daily  levETIRAcetam  Solution 1500 milliGRAM(s) Oral two times a day  levothyroxine 25 MICROGram(s) Oral daily  multivitamin 1 Tablet(s) Oral daily  pantoprazole    Tablet 40 milliGRAM(s) Oral before breakfast  potassium phosphate / sodium phosphate Powder (PHOS-NaK) 1 Packet(s) Oral three times a day  rifAXIMin 550 milliGRAM(s) Oral two times a day  thiamine 100 milliGRAM(s) Oral daily    MEDICATIONS  (PRN):

## 2022-03-23 NOTE — PROGRESS NOTE ADULT - ASSESSMENT
ICU transfer summary Note: This is 41 year old man with a PMHx of seizure (on keppra). Family called EMS after finding pt unresponsive in his bed. On arrival EMS reports pt was spontaneously breathing and had a pulse but was only responsive to pain. Patient's finger stick was noted to be 23. Per EMS, pt went into witnessed arrest shortly there after. ACLS immediately initiated, ROSC achieved after about 5-10 mins. No shocks given. On arrival to ED, pt in sinus tachy, glucose in 200s, hypotensive to 70s systolic. Levophed drip was started. Patient started biting on the ETT and fentanyl drip was ordered. Labs showed K: 3.4, Cl: 87, gap: 43, BUN: 33, Creat: 3.6, T Bili: 4.8, AST/ALT elevated, L: 18.9, TSH: 6.9, pH: 7.0, pCo2: 25, HCO3: 7. CT Head done reported no acute intracranial hemorrhage or mass effect and no evidence of diffuse global anoxic injury on head CT. Patient was admitted to the ICU for management of acute hypoxic respiratory failure, post cardiac arrest, encephalopathy, JHONATAN, transaminitis, hypoglycemia, lactic acidosis, and severe metabolic acidosis. Endocrinology was consulted. On 03/19, patient was extubated. Post extubation, patient was confused/ disoriented, tachycardic, hand tremors. There were concern for ETOH withdrawal. Phenobarbital tapering was started with improvement in CIWA score. EEG shows Increased risk of focal seizures with onset in the right temporal region or left frontotemporal region. Patient was restarted back to his home dose Keppra after renal function returned to normal. 03/16/22, patient was noted to have a fever. CXR shows new bibasilar airspace infiltrates most compatible with aspiration pneumonia. Blood culture pending and procal decreasing. Zoysn for a total of 7 days. Today, patient is hemodynamically stable. Patient had no acute event overnight. Patient has remained on room sat on 100% s/p extubation. Continue patient's phenobarbital taper, amlodipine for HTN, Keppra for sz, synthroid for hypothyroidism, and lactulose along with Rifaximin for encephalopathy. Ammonia normalized. AM level ordered. LFTs still elevated, continue to monitor. Follow up blood culture. Trend CBC and CMP. Replete electrolytes if needed. Transferred to medical floor on 3/23/22.     Acute hypoxic respiratory failure s/p cardiac arrest. Likely from aspiration pneumonia. s/p finished antibiotic course. DC tele and pulse ox  ? alcohol withdrawal. I could not confirm alcohol abuse hx. DC phenobarbitone. aspiration, seizure precautions.   Hx of seizure. cont keppra. consulted neurology service. follow recs.   Acute metabolic/toxic encephalopathy. from seizure vs cardiac arrest. consulted neurologist. Follow recs. recheck ammonia in am. cont lactulose and rifaximin.   Hypoglycemia from likely seizure. improved.   JHONATAN. improved.   Dysphagia. cont pureed diet. aspiration precautions.   macrocytic anemia. check vitamin B12 and folate. no active gross bleeding.   Hypophosphatemia. replete and recheck.   Debility. PT evaluated. ERICK recommended.   Underweight with BMI< 19. nutritional consult recs appreciated.   DC tele and pulse ox. CHG added.     Full code.   DVT ppx: Lovenox.   Dispo: ERICK. discussed with care manager and .

## 2022-03-23 NOTE — PROGRESS NOTE ADULT - SUBJECTIVE AND OBJECTIVE BOX
Patient is a 41y old  Male who presents with a chief complaint of S/P Cardiac Arrest, Hypoglycemia, JHONATAN, metabolic acidosis, Transaminitis (19 Mar 2022 09:24)      Interval History: .  Continued on 25 MCG of levothyroxine.  Blood glucose is in normal range.  Clinically much better    MEDICATIONS  (STANDING):  amLODIPine   Tablet 10 milliGRAM(s) Oral daily  ascorbic acid 500 milliGRAM(s) Oral daily  chlorhexidine 2% Cloths 1 Application(s) Topical daily  dextrose 40% Gel 15 Gram(s) Oral once  dextrose 5%. 1000 milliLiter(s) (50 mL/Hr) IV Continuous <Continuous>  dextrose 50% Injectable 25 Gram(s) IV Push once  dextrose 50% Injectable 12.5 Gram(s) IV Push once  dextrose 50% Injectable 25 Gram(s) IV Push once  enoxaparin Injectable 40 milliGRAM(s) SubCutaneous every 24 hours  folic acid 1 milliGRAM(s) Oral daily  glucagon  Injectable 1 milliGRAM(s) IntraMuscular once  insulin lispro (ADMELOG) corrective regimen sliding scale   SubCutaneous three times a day before meals  lactulose Syrup 10 Gram(s) Oral daily  levETIRAcetam  Solution 1500 milliGRAM(s) Oral two times a day  levothyroxine 25 MICROGram(s) Oral daily  multivitamin 1 Tablet(s) Oral daily  pantoprazole    Tablet 40 milliGRAM(s) Oral before breakfast  potassium phosphate / sodium phosphate Powder (PHOS-NaK) 1 Packet(s) Oral three times a day  rifAXIMin 550 milliGRAM(s) Oral two times a day  thiamine 100 milliGRAM(s) Oral daily    MEDICATIONS  (PRN):      Allergies    Allergy Status Unknown    Intolerances        REVIEW OF SYSTEMS:  CONSTITUTIONAL: no changes  EYES: No eye pain, visual disturbances, or discharge  ENMT:  No difficulty hearing, No sinus or throat pain  NECK: No pain or stiffness  RESPIRATORY: No cough, wheezing, chills or hemoptysis; No shortness of breath  CARDIOVASCULAR: No chest pain, palpitations or leg swelling  GASTROINTESTINAL: No abdominal or epigastric pain. No nausea, vomiting, or hematemesis; No diarrhea or constipation. No melena or hematochezia.  GENITOURINARY: No dysuria, frequency, hematuria, or incontinence  NEUROLOGICAL: No headaches, memory loss, loss of strength, numbness, or tremors  SKIN: No itching, burning, rashes, or lesions   ENDOCRINE: No heat or cold intolerance; No hair loss  MUSCULOSKELETAL: No joint pain or swelling; No muscle, back, or extremity pain  PSYCHIATRIC: No depression, anxiety, mood swings, or difficulty sleeping  HEME/LYMPH: No easy bruising, or bleeding gums  ALLERY AND IMMUNOLOGIC: No hives or eczema    Vital Signs Last 24 Hrs  T(C): 38.1 (23 Mar 2022 21:48), Max: 38.1 (23 Mar 2022 17:00)  T(F): 100.5 (23 Mar 2022 21:48), Max: 100.6 (23 Mar 2022 17:00)  HR: 99 (23 Mar 2022 16:10) (90 - 105)  BP: 117/75 (23 Mar 2022 16:10) (117/75 - 137/95)  BP(mean): --  RR: 18 (23 Mar 2022 16:10) (16 - 18)  SpO2: 98% (23 Mar 2022 16:10) (97% - 99%)    PHYSICAL EXAM:  GENERAL:   HEAD: Atraumatic, Normocephalic  EYES: PERRLA, conjunctiva and sclera clear  ENMT: No  exudates,; Moist mucous membranes,, No lesions  NECK: Supple, No JVD, Normal thyroid  NERVOUS SYSTEM:  Alert & Oriented,   CHEST/LUNG: Clear to auscultation bilaterally; No rales, rhonchi, wheezing, or rubs  HEART: Regular rate and rhythm; No murmurs, rubs, or gallops  ABDOMEN: Soft, Nontender, Nondistended; Bowel sounds present  EXTREMITIES:  2+ Peripheral Pulses, no edema  SKIN: No rashes or lesions    LABS:        CAPILLARY BLOOD GLUCOSE      POCT Blood Glucose.: 127 mg/dL (23 Mar 2022 16:03)  POCT Blood Glucose.: 142 mg/dL (23 Mar 2022 11:31)  POCT Blood Glucose.: 144 mg/dL (23 Mar 2022 07:34)    Lipid panel:           Thyroid:  Diabetes Tests:  Parathyroid Panel:  Adrenals:  RADIOLOGY & ADDITIONAL TESTS:    Imaging Personally Reviewed:  [ ] YES  [ ] NO    Consultant(s) Notes Reviewed:  [ ] YES  [ ] NO    Care Discussed with Consultants/Other Providers [ ] YES  [ ] NO

## 2022-03-23 NOTE — CONSULT NOTE ADULT - CONSULT REASON
Seizure disorder
41 year old male without capacity and question regarding appropriate surrogate decision maker
Hypothyroidism

## 2022-03-24 LAB
AMMONIA BLD-MCNC: 15 UMOL/L — SIGNIFICANT CHANGE UP (ref 11–32)
FLUAV AG NPH QL: SIGNIFICANT CHANGE UP
FLUBV AG NPH QL: SIGNIFICANT CHANGE UP
GLUCOSE BLDC GLUCOMTR-MCNC: 107 MG/DL — HIGH (ref 70–99)
GLUCOSE BLDC GLUCOMTR-MCNC: 113 MG/DL — HIGH (ref 70–99)
GLUCOSE BLDC GLUCOMTR-MCNC: 134 MG/DL — HIGH (ref 70–99)
PHOSPHATE SERPL-MCNC: 3 MG/DL — SIGNIFICANT CHANGE UP (ref 2.5–4.5)
SARS-COV-2 RNA SPEC QL NAA+PROBE: SIGNIFICANT CHANGE UP

## 2022-03-24 PROCEDURE — 70551 MRI BRAIN STEM W/O DYE: CPT | Mod: 26

## 2022-03-24 PROCEDURE — 99233 SBSQ HOSP IP/OBS HIGH 50: CPT

## 2022-03-24 RX ORDER — ACETAMINOPHEN 500 MG
650 TABLET ORAL EVERY 6 HOURS
Refills: 0 | Status: DISCONTINUED | OUTPATIENT
Start: 2022-03-24 | End: 2022-03-25

## 2022-03-24 RX ADMIN — CHLORHEXIDINE GLUCONATE 1 APPLICATION(S): 213 SOLUTION TOPICAL at 14:58

## 2022-03-24 RX ADMIN — PANTOPRAZOLE SODIUM 40 MILLIGRAM(S): 20 TABLET, DELAYED RELEASE ORAL at 08:23

## 2022-03-24 RX ADMIN — Medication 500 MILLIGRAM(S): at 14:48

## 2022-03-24 RX ADMIN — ENOXAPARIN SODIUM 40 MILLIGRAM(S): 100 INJECTION SUBCUTANEOUS at 09:31

## 2022-03-24 RX ADMIN — Medication 1 PACKET(S): at 15:00

## 2022-03-24 RX ADMIN — Medication 1 TABLET(S): at 14:48

## 2022-03-24 RX ADMIN — LEVETIRACETAM 1500 MILLIGRAM(S): 250 TABLET, FILM COATED ORAL at 09:32

## 2022-03-24 RX ADMIN — Medication 650 MILLIGRAM(S): at 01:30

## 2022-03-24 RX ADMIN — Medication 25 MICROGRAM(S): at 06:57

## 2022-03-24 RX ADMIN — LEVETIRACETAM 1500 MILLIGRAM(S): 250 TABLET, FILM COATED ORAL at 21:44

## 2022-03-24 RX ADMIN — Medication 1 PACKET(S): at 22:19

## 2022-03-24 RX ADMIN — AMLODIPINE BESYLATE 10 MILLIGRAM(S): 2.5 TABLET ORAL at 06:56

## 2022-03-24 RX ADMIN — Medication 650 MILLIGRAM(S): at 09:32

## 2022-03-24 RX ADMIN — Medication 1 MILLIGRAM(S): at 14:48

## 2022-03-24 RX ADMIN — LACTULOSE 10 GRAM(S): 10 SOLUTION ORAL at 14:51

## 2022-03-24 RX ADMIN — Medication 100 MILLIGRAM(S): at 14:49

## 2022-03-24 RX ADMIN — Medication 1 PACKET(S): at 07:04

## 2022-03-24 NOTE — SWALLOW BEDSIDE ASSESSMENT ADULT - COMMENTS
CT head no contrast 3/16/2022IMPRESSION:No acute intracranial hemorrhage or mass effect. No evidence of diffuse global anoxic injury on head CT.  Right temporal craniotomy with resection cavity and encephalomalacia/gliosis in the right anterior temporal lobe.    CXR 3/21/2022IMPRESSION:Endotracheal tube nasogastric tube have been removed.  low lung volumes. No change heart mediastinum. New bibasilar airspace infiltrates most compatible with aspiration pneumonia in this clinical setting. small hazy pleural effusions  suspect. No pneumothorax.

## 2022-03-24 NOTE — PROGRESS NOTE ADULT - NUTRITIONAL ASSESSMENT
This patient has been assessed with a concern for Malnutrition and has been determined to have a diagnosis/diagnoses of Underweight (BMI < 19).    This patient is being managed with:   Diet Pureed-  Consistent Carbohydrate {No Snacks}  Mildly Thick Liquids (MILDTHICKLIQS)  Entered: Mar 20 2022 11:08AM    
This patient has been assessed with a concern for Malnutrition and has been determined to have a diagnosis/diagnoses of Underweight (BMI < 19).    This patient is being managed with:   Diet NPO-  Entered: Mar 16 2022 10:25PM

## 2022-03-24 NOTE — PROGRESS NOTE ADULT - SUBJECTIVE AND OBJECTIVE BOX
Patient is a 41y old  Male who presents with a chief complaint of S/P Cardiac Arrest, Hypoglycemia, JHONATAN, metabolic acidosis, Transaminitis (19 Mar 2022 09:24)      Interval History: on 25 mcg levothyroxine PO   stable Endocrine wise     MEDICATIONS  (STANDING):  amLODIPine   Tablet 10 milliGRAM(s) Oral daily  ascorbic acid 500 milliGRAM(s) Oral daily  chlorhexidine 2% Cloths 1 Application(s) Topical daily  dextrose 40% Gel 15 Gram(s) Oral once  dextrose 5%. 1000 milliLiter(s) (50 mL/Hr) IV Continuous <Continuous>  dextrose 50% Injectable 25 Gram(s) IV Push once  dextrose 50% Injectable 12.5 Gram(s) IV Push once  dextrose 50% Injectable 25 Gram(s) IV Push once  enoxaparin Injectable 40 milliGRAM(s) SubCutaneous every 24 hours  folic acid 1 milliGRAM(s) Oral daily  glucagon  Injectable 1 milliGRAM(s) IntraMuscular once  insulin lispro (ADMELOG) corrective regimen sliding scale   SubCutaneous three times a day before meals  lactulose Syrup 10 Gram(s) Oral daily  levETIRAcetam  Solution 1500 milliGRAM(s) Oral two times a day  levothyroxine 25 MICROGram(s) Oral daily  multivitamin 1 Tablet(s) Oral daily  pantoprazole    Tablet 40 milliGRAM(s) Oral before breakfast  potassium phosphate / sodium phosphate Powder (PHOS-NaK) 1 Packet(s) Oral three times a day  rifAXIMin 550 milliGRAM(s) Oral two times a day  thiamine 100 milliGRAM(s) Oral daily    MEDICATIONS  (PRN):  acetaminophen     Tablet .. 650 milliGRAM(s) Oral every 6 hours PRN Temp greater or equal to 38C (100.4F), Mild Pain (1 - 3)      Allergies    Allergy Status Unknown    Intolerances        REVIEW OF SYSTEMS:  CONSTITUTIONAL: no changes  EYES: No eye pain, visual disturbances, or discharge  ENMT:  No difficulty hearing, No sinus or throat pain  NECK: No pain or stiffness  RESPIRATORY: No cough, wheezing, chills or hemoptysis; No shortness of breath  CARDIOVASCULAR: No chest pain, palpitations or leg swelling  GASTROINTESTINAL: No abdominal or epigastric pain. No nausea, vomiting, or hematemesis; No diarrhea or constipation. No melena or hematochezia.  GENITOURINARY: No dysuria, frequency, hematuria, or incontinence  NEUROLOGICAL: No headaches, memory loss, loss of strength, numbness, or tremors  SKIN: No itching, burning, rashes, or lesions   ENDOCRINE: No heat or cold intolerance; No hair loss  MUSCULOSKELETAL: No joint pain or swelling; No muscle, back, or extremity pain  PSYCHIATRIC: No depression, anxiety, mood swings, or difficulty sleeping  HEME/LYMPH: No easy bruising, or bleeding gums  ALLERY AND IMMUNOLOGIC: No hives or eczema    Vital Signs Last 24 Hrs  T(C): 37.1 (24 Mar 2022 10:27), Max: 38.1 (23 Mar 2022 17:00)  T(F): 98.8 (24 Mar 2022 10:27), Max: 100.6 (23 Mar 2022 17:00)  HR: 102 (24 Mar 2022 10:27) (96 - 105)  BP: 112/75 (24 Mar 2022 10:27) (112/75 - 127/82)  BP(mean): --  RR: 17 (24 Mar 2022 10:27) (17 - 18)  SpO2: 97% (24 Mar 2022 10:27) (93% - 98%)    PHYSICAL EXAM:  GENERAL:   HEAD: Atraumatic, Normocephalic  EYES: PERRLA, conjunctiva and sclera clear  ENMT: No  exudates,; Moist mucous membranes,, No lesions  NECK: Supple, No JVD, Normal thyroid  NERVOUS SYSTEM:  Alert & Oriented,   CHEST/LUNG: Clear to auscultation bilaterally; No rales, rhonchi, wheezing, or rubs  HEART: Regular rate and rhythm; No murmurs, rubs, or gallops  ABDOMEN: Soft, Nontender, Nondistended; Bowel sounds present  EXTREMITIES:  2+ Peripheral Pulses, no edema  SKIN: No rashes or lesions    LABS:        CAPILLARY BLOOD GLUCOSE      POCT Blood Glucose.: 134 mg/dL (24 Mar 2022 11:22)  POCT Blood Glucose.: 113 mg/dL (24 Mar 2022 07:40)  POCT Blood Glucose.: 137 mg/dL (23 Mar 2022 23:37)  POCT Blood Glucose.: 127 mg/dL (23 Mar 2022 16:03)    Lipid panel:           Thyroid:  Diabetes Tests:  Parathyroid Panel:  Adrenals:  RADIOLOGY & ADDITIONAL TESTS:    Imaging Personally Reviewed:  [ ] YES  [ ] NO    Consultant(s) Notes Reviewed:  [ ] YES  [ ] NO    Care Discussed with Consultants/Other Providers [ ] YES  [ ] NO

## 2022-03-24 NOTE — SWALLOW BEDSIDE ASSESSMENT ADULT - H & P REVIEW
41 year old man with a PMHx of seizure (on keppra). Family called EMS after finding pt unresponsive in his bed. On arrival EMS reports pt was spontaneously breathing and had a pulse but was only responsive to pain. Finger stick was 23. Per EMS, pt went into witnessed arrest shortly there after, ACLS immediately initiated, ROSC achieved after about 5-10 mins. No shocks given.  pt intubated 3/16 and s/p extubated 3/19/yes

## 2022-03-24 NOTE — SWALLOW BEDSIDE ASSESSMENT ADULT - SLP GENERAL OBSERVATIONS
pt seen bedside, during breakfast alert and oriented to self. pt responded to questions and verbalized needs- noted confusion and he inconsistently followed directions. speech intelligibility fair to poor 2/2 rapid rate, low volume and imprecise articulation. upper extremity weakness pt needed assistance with feeding.

## 2022-03-24 NOTE — SWALLOW BEDSIDE ASSESSMENT ADULT - SWALLOW EVAL: DIAGNOSIS
oropharyngeal phases of swallow marked by reduced cognition, slow oral transport posterior, suspected timely swallow trigger with adequate hyolaryngeal excursion/elevation to palpation. intermittent throat clear/cough with thin liquid.

## 2022-03-24 NOTE — PROGRESS NOTE ADULT - ASSESSMENT
ICU transfer summary Note: This is 41 year old man with a PMHx of seizure (on keppra). Family called EMS after finding pt unresponsive in his bed. On arrival EMS reports pt was spontaneously breathing and had a pulse but was only responsive to pain. Patient's finger stick was noted to be 23. Per EMS, pt went into witnessed arrest shortly there after. ACLS immediately initiated, ROSC achieved after about 5-10 mins. No shocks given. On arrival to ED, pt in sinus tachy, glucose in 200s, hypotensive to 70s systolic. Levophed drip was started. Patient started biting on the ETT and fentanyl drip was ordered. Labs showed K: 3.4, Cl: 87, gap: 43, BUN: 33, Creat: 3.6, T Bili: 4.8, AST/ALT elevated, L: 18.9, TSH: 6.9, pH: 7.0, pCo2: 25, HCO3: 7. CT Head done reported no acute intracranial hemorrhage or mass effect and no evidence of diffuse global anoxic injury on head CT. Patient was admitted to the ICU for management of acute hypoxic respiratory failure, post cardiac arrest, encephalopathy, JHONATAN, transaminitis, hypoglycemia, lactic acidosis, and severe metabolic acidosis. Endocrinology was consulted. On 03/19, patient was extubated. Post extubation, patient was confused/ disoriented, tachycardic, hand tremors. There were concern for ETOH withdrawal. Phenobarbital tapering was started with improvement in CIWA score. EEG shows Increased risk of focal seizures with onset in the right temporal region or left frontotemporal region. Patient was restarted back to his home dose Keppra after renal function returned to normal. 03/16/22, patient was noted to have a fever. CXR shows new bibasilar airspace infiltrates most compatible with aspiration pneumonia. Blood culture pending and procal decreasing. Zoysn for a total of 7 days. Today, patient is hemodynamically stable. Patient had no acute event overnight. Patient has remained on room sat on 100% s/p extubation. Continue patient's phenobarbital taper, amlodipine for HTN, Keppra for sz, synthroid for hypothyroidism, and lactulose along with Rifaximin for encephalopathy. Ammonia normalized. AM level ordered. LFTs still elevated, continue to monitor. Follow up blood culture. Trend CBC and CMP. Replete electrolytes if needed. Transferred to medical floor on 3/23/22.     Acute hypoxic respiratory failure s/p cardiac arrest. Likely from aspiration pneumonia. s/p finished antibiotic course. Off tele and pulse ox  ? alcohol withdrawal. Alcoholism per wife per /care manager note. off phenobarbitone now. add thiamine.  Hx of seizure. cont keppra. consulted neurology service. reviewed recs. Follow MRI brain and EEG. check keppra level.   Acute metabolic/toxic encephalopathy. from seizure vs cardiac arrest. consulted neurologist. Follow MRI brain and EEG.   Hypoglycemia from likely seizure. improved.   JHONATAN. improved.   Dysphagia. cont pureed diet. aspiration precautions.   macrocytic anemia. reviewed vitamin B12 and folate. no active gross bleeding.   Hypophosphatemia. repleted   Debility. PT evaluated. ERICK recommended.   Underweight with BMI< 19. nutritional consult recs appreciated.   Alcoholic liver cirrhosis. patient has frequent BMs per nurse. Ammonia WNL. DC rifaximin. cont lactulose with Goal BM 2-3 a day.   DC tele and pulse ox. CHG added.     Full code.   DVT ppx: Lovenox.   Dispo: ERICK. discussed with care manager and .  ICU transfer summary Note: This is 41 year old man with a PMHx of seizure (on keppra). Family called EMS after finding pt unresponsive in his bed. On arrival EMS reports pt was spontaneously breathing and had a pulse but was only responsive to pain. Patient's finger stick was noted to be 23. Per EMS, pt went into witnessed arrest shortly there after. ACLS immediately initiated, ROSC achieved after about 5-10 mins. No shocks given. On arrival to ED, pt in sinus tachy, glucose in 200s, hypotensive to 70s systolic. Levophed drip was started. Patient started biting on the ETT and fentanyl drip was ordered. Labs showed K: 3.4, Cl: 87, gap: 43, BUN: 33, Creat: 3.6, T Bili: 4.8, AST/ALT elevated, L: 18.9, TSH: 6.9, pH: 7.0, pCo2: 25, HCO3: 7. CT Head done reported no acute intracranial hemorrhage or mass effect and no evidence of diffuse global anoxic injury on head CT. Patient was admitted to the ICU for management of acute hypoxic respiratory failure, post cardiac arrest, encephalopathy, JHONATAN, transaminitis, hypoglycemia, lactic acidosis, and severe metabolic acidosis. Endocrinology was consulted. On 03/19, patient was extubated. Post extubation, patient was confused/ disoriented, tachycardic, hand tremors. There were concern for ETOH withdrawal. Phenobarbital tapering was started with improvement in CIWA score. EEG shows Increased risk of focal seizures with onset in the right temporal region or left frontotemporal region. Patient was restarted back to his home dose Keppra after renal function returned to normal. 03/16/22, patient was noted to have a fever. CXR shows new bibasilar airspace infiltrates most compatible with aspiration pneumonia. Blood culture pending and procal decreasing. Zoysn for a total of 7 days. Today, patient is hemodynamically stable. Patient had no acute event overnight. Patient has remained on room sat on 100% s/p extubation. Continue patient's phenobarbital taper, amlodipine for HTN, Keppra for sz, synthroid for hypothyroidism, and lactulose along with Rifaximin for encephalopathy. Ammonia normalized. AM level ordered. LFTs still elevated, continue to monitor. Follow up blood culture. Trend CBC and CMP. Replete electrolytes if needed. Transferred to medical floor on 3/23/22.     Acute hypoxic respiratory failure s/p cardiac arrest. Likely from aspiration pneumonia. s/p finished antibiotic course. Off tele and pulse ox. Trend fever curve off antibiotic.   ? alcohol withdrawal. Alcoholism per wife per /care manager note. off phenobarbitone now. add thiamine.  Hx of seizure. cont keppra. consulted neurology service. reviewed recs. Follow MRI brain and EEG. check keppra level.   Acute metabolic/toxic encephalopathy. from seizure vs cardiac arrest. consulted neurologist. Follow MRI brain and EEG.   Hypoglycemia from likely seizure. improved.   JHONATAN. improved.   Dysphagia. cont pureed diet. aspiration precautions.   macrocytic anemia. reviewed vitamin B12 and folate. no active gross bleeding.   Hypophosphatemia. repleted   Debility. PT evaluated. ERICK recommended.   Underweight with BMI< 19. nutritional consult recs appreciated.   Alcoholic liver cirrhosis. patient has frequent BMs per nurse. Ammonia WNL. DC rifaximin. cont lactulose with Goal BM 2-3 a day.   DC tele and pulse ox. CHG added.     Full code.   DVT ppx: Lovenox.   Dispo: ERICK. discussed with care manager and .

## 2022-03-24 NOTE — PROGRESS NOTE ADULT - SUBJECTIVE AND OBJECTIVE BOX
CHIEF COMPLAINT: Follow up of acute respiratory failure, acute metabolic encephalopathy  no report of any fever, vomiting, diarrhea. active gross bleeding  tolerating oral diet.   confusion +      PHYSICAL EXAM:    GENERAL: cachectic, no acute distress   CHEST/LUNG: Few crackles right base, otherwise CTA. no wheezing   HEART: Regular rate and rhythm  ABDOMEN: Soft, Nontender, Nondistended; Bowel sounds present  EXTREMITIES:  Moving all four extremities spontaneously, No clubbing, cyanosis, or edema  NERVOUS SYSTEM:  Grossly non focal ( based on observation as patient actually did not follow my commands.  Psychiatry: AAO x his name but not to place or  or age.       OBJECTIVE DATA:     Vital Signs Last 24 Hrs  T(C): 37.1 (24 Mar 2022 10:27), Max: 38.1 (23 Mar 2022 17:00)  T(F): 98.8 (24 Mar 2022 10:27), Max: 100.6 (23 Mar 2022 17:00)  HR: 102 (24 Mar 2022 10:27) (96 - 105)  BP: 112/75 (24 Mar 2022 10:27) (112/75 - 127/82)  BP(mean): --  RR: 17 (24 Mar 2022 10:27) (17 - 18)  SpO2: 97% (24 Mar 2022 10:27) (93% - 98%)           Daily     Daily   LABS:                        9.4    9.87  )-----------( 150      ( 23 Mar 2022 06:42 )             27.8                 144  |  110<H>  |  7   ----------------------------<  130<H>  3.5   |  25  |  0.66    Ca    8.1<L>      23 Mar 2022 06:42  Phos  3.0     -  Mg     1.6         TPro  5.2<L>  /  Alb  2.5<L>  /  TBili  4.1<H>  /  DBili  x   /  AST  154<H>  /  ALT  133<H>  /  AlkPhos  225<H>                         CAPILLARY BLOOD GLUCOSE      POCT Blood Glucose.: 134 mg/dL (24 Mar 2022 11:22)      Culture - Blood (collected )  Source: .Blood Blood  Preliminary Report ():    No growth to date.    Culture - Blood (collected )  Source: .Blood Blood  Preliminary Report ():    No growth to date.    MEDICATIONS  (STANDING):  amLODIPine   Tablet 10 milliGRAM(s) Oral daily  ascorbic acid 500 milliGRAM(s) Oral daily  chlorhexidine 2% Cloths 1 Application(s) Topical daily  dextrose 40% Gel 15 Gram(s) Oral once  dextrose 5%. 1000 milliLiter(s) (50 mL/Hr) IV Continuous <Continuous>  dextrose 50% Injectable 25 Gram(s) IV Push once  dextrose 50% Injectable 12.5 Gram(s) IV Push once  dextrose 50% Injectable 25 Gram(s) IV Push once  enoxaparin Injectable 40 milliGRAM(s) SubCutaneous every 24 hours  folic acid 1 milliGRAM(s) Oral daily  glucagon  Injectable 1 milliGRAM(s) IntraMuscular once  insulin lispro (ADMELOG) corrective regimen sliding scale   SubCutaneous three times a day before meals  lactulose Syrup 10 Gram(s) Oral daily  levETIRAcetam  Solution 1500 milliGRAM(s) Oral two times a day  levothyroxine 25 MICROGram(s) Oral daily  multivitamin 1 Tablet(s) Oral daily  pantoprazole    Tablet 40 milliGRAM(s) Oral before breakfast  potassium phosphate / sodium phosphate Powder (PHOS-NaK) 1 Packet(s) Oral three times a day  thiamine 100 milliGRAM(s) Oral daily    MEDICATIONS  (PRN):  acetaminophen     Tablet .. 650 milliGRAM(s) Oral every 6 hours PRN Temp greater or equal to 38C (100.4F), Mild Pain (1 - 3)       CHIEF COMPLAINT: Follow up of acute respiratory failure, acute metabolic encephalopathy  no report of any fever, vomiting. active gross bleeding  tolerating oral diet.   confusion +  Frequent BMs     PHYSICAL EXAM:    GENERAL: cachectic, no acute distress   CHEST/LUNG: Few crackles right base, otherwise CTA. no wheezing   HEART: Regular rate and rhythm  ABDOMEN: Soft, Nontender, Nondistended; Bowel sounds present  EXTREMITIES:  Moving all four extremities spontaneously, No clubbing, cyanosis, or edema  NERVOUS SYSTEM:  Grossly non focal ( based on observation as patient actually did not follow my commands.  Psychiatry: AAO x his name but not to place or  or age.       OBJECTIVE DATA:     Vital Signs Last 24 Hrs  T(C): 37.1 (24 Mar 2022 10:27), Max: 38.1 (23 Mar 2022 17:00)  T(F): 98.8 (24 Mar 2022 10:27), Max: 100.6 (23 Mar 2022 17:00)  HR: 102 (24 Mar 2022 10:27) (96 - 105)  BP: 112/75 (24 Mar 2022 10:27) (112/75 - 127/82)  BP(mean): --  RR: 17 (24 Mar 2022 10:27) (17 - 18)  SpO2: 97% (24 Mar 2022 10:27) (93% - 98%)           Daily     Daily   LABS:                        9.4    9.87  )-----------( 150      ( 23 Mar 2022 06:42 )             27.8                 144  |  110<H>  |  7   ----------------------------<  130<H>  3.5   |  25  |  0.66    Ca    8.1<L>      23 Mar 2022 06:42  Phos  3.0     -24  Mg     1.6         TPro  5.2<L>  /  Alb  2.5<L>  /  TBili  4.1<H>  /  DBili  x   /  AST  154<H>  /  ALT  133<H>  /  AlkPhos  225<H>                         CAPILLARY BLOOD GLUCOSE      POCT Blood Glucose.: 134 mg/dL (24 Mar 2022 11:22)      Culture - Blood (collected )  Source: .Blood Blood  Preliminary Report ():    No growth to date.    Culture - Blood (collected )  Source: .Blood Blood  Preliminary Report ():    No growth to date.    MEDICATIONS  (STANDING):  amLODIPine   Tablet 10 milliGRAM(s) Oral daily  ascorbic acid 500 milliGRAM(s) Oral daily  chlorhexidine 2% Cloths 1 Application(s) Topical daily  dextrose 40% Gel 15 Gram(s) Oral once  dextrose 5%. 1000 milliLiter(s) (50 mL/Hr) IV Continuous <Continuous>  dextrose 50% Injectable 25 Gram(s) IV Push once  dextrose 50% Injectable 12.5 Gram(s) IV Push once  dextrose 50% Injectable 25 Gram(s) IV Push once  enoxaparin Injectable 40 milliGRAM(s) SubCutaneous every 24 hours  folic acid 1 milliGRAM(s) Oral daily  glucagon  Injectable 1 milliGRAM(s) IntraMuscular once  insulin lispro (ADMELOG) corrective regimen sliding scale   SubCutaneous three times a day before meals  lactulose Syrup 10 Gram(s) Oral daily  levETIRAcetam  Solution 1500 milliGRAM(s) Oral two times a day  levothyroxine 25 MICROGram(s) Oral daily  multivitamin 1 Tablet(s) Oral daily  pantoprazole    Tablet 40 milliGRAM(s) Oral before breakfast  potassium phosphate / sodium phosphate Powder (PHOS-NaK) 1 Packet(s) Oral three times a day  thiamine 100 milliGRAM(s) Oral daily    MEDICATIONS  (PRN):  acetaminophen     Tablet .. 650 milliGRAM(s) Oral every 6 hours PRN Temp greater or equal to 38C (100.4F), Mild Pain (1 - 3)       CHIEF COMPLAINT: Follow up of acute respiratory failure, acute metabolic encephalopathy  no vomiting. active gross bleeding  Low grade fever 100.2  tolerating oral diet.   confusion +  Frequent BMs     PHYSICAL EXAM:    GENERAL: cachectic, no acute distress   CHEST/LUNG: Few crackles right base, otherwise CTA. no wheezing   HEART: Regular rate and rhythm  ABDOMEN: Soft, Nontender, Nondistended; Bowel sounds present  EXTREMITIES:  Moving all four extremities spontaneously, No clubbing, cyanosis, or edema  NERVOUS SYSTEM:  Grossly non focal ( based on observation as patient actually did not follow my commands.  Psychiatry: AAO x his name but not to place or  or age.       OBJECTIVE DATA:     Vital Signs Last 24 Hrs  T(C): 37.1 (24 Mar 2022 10:27), Max: 38.1 (23 Mar 2022 17:00)  T(F): 98.8 (24 Mar 2022 10:27), Max: 100.6 (23 Mar 2022 17:00)  HR: 102 (24 Mar 2022 10:27) (96 - 105)  BP: 112/75 (24 Mar 2022 10:27) (112/75 - 127/82)  BP(mean): --  RR: 17 (24 Mar 2022 10:27) (17 - 18)  SpO2: 97% (24 Mar 2022 10:27) (93% - 98%)           Daily     Daily   LABS:                        9.4    9.87  )-----------( 150      ( 23 Mar 2022 06:42 )             27.8                 144  |  110<H>  |  7   ----------------------------<  130<H>  3.5   |  25  |  0.66    Ca    8.1<L>      23 Mar 2022 06:42  Phos  3.0     -  Mg     1.6         TPro  5.2<L>  /  Alb  2.5<L>  /  TBili  4.1<H>  /  DBili  x   /  AST  154<H>  /  ALT  133<H>  /  AlkPhos  225<H>                         CAPILLARY BLOOD GLUCOSE      POCT Blood Glucose.: 134 mg/dL (24 Mar 2022 11:22)      Culture - Blood (collected )  Source: .Blood Blood  Preliminary Report ():    No growth to date.    Culture - Blood (collected )  Source: .Blood Blood  Preliminary Report ():    No growth to date.    MEDICATIONS  (STANDING):  amLODIPine   Tablet 10 milliGRAM(s) Oral daily  ascorbic acid 500 milliGRAM(s) Oral daily  chlorhexidine 2% Cloths 1 Application(s) Topical daily  dextrose 40% Gel 15 Gram(s) Oral once  dextrose 5%. 1000 milliLiter(s) (50 mL/Hr) IV Continuous <Continuous>  dextrose 50% Injectable 25 Gram(s) IV Push once  dextrose 50% Injectable 12.5 Gram(s) IV Push once  dextrose 50% Injectable 25 Gram(s) IV Push once  enoxaparin Injectable 40 milliGRAM(s) SubCutaneous every 24 hours  folic acid 1 milliGRAM(s) Oral daily  glucagon  Injectable 1 milliGRAM(s) IntraMuscular once  insulin lispro (ADMELOG) corrective regimen sliding scale   SubCutaneous three times a day before meals  lactulose Syrup 10 Gram(s) Oral daily  levETIRAcetam  Solution 1500 milliGRAM(s) Oral two times a day  levothyroxine 25 MICROGram(s) Oral daily  multivitamin 1 Tablet(s) Oral daily  pantoprazole    Tablet 40 milliGRAM(s) Oral before breakfast  potassium phosphate / sodium phosphate Powder (PHOS-NaK) 1 Packet(s) Oral three times a day  thiamine 100 milliGRAM(s) Oral daily    MEDICATIONS  (PRN):  acetaminophen     Tablet .. 650 milliGRAM(s) Oral every 6 hours PRN Temp greater or equal to 38C (100.4F), Mild Pain (1 - 3)

## 2022-03-24 NOTE — EEG REPORT - NS EEG TEXT BOX
REPORT OF ROUTINE EEG WITH VIDEO  [facility]: [address], [address] [place] [address], Phone: [phone] [facility]: [phone] [place] [address], [place] [address], Phone: [phone] Office: [address] [place] [address], [place] [address], [place] [address], Phone: [phone]  Patient Name: JOCELYN JOSEPH   Age: 41 year : 1980 MRN #: -, Location: ICU 8 Referring Physician: -  EEG #: [id number] Study Date: 3/24/2022    		  Technical Information:					 On Instrument: - Placement and Labeling of Electrodes: The EEG was performed utilizing 20 channels referential EEG connections (coronal over temporal over parasagittal montage) using all standard 10-20 electrode placements with EKG.  Recording was at a sampling rate of 256 samples per second per channel.  Time synchronized digital video recording was done simultaneously with EEG recording.  A low light infrared camera was used for low light recording.  Ken and seizure detection algorithms were utilized. CSA Technical Component: Quantitative EEG analysis using a separate Compressed Spectral Array (CSA) software package was conducted in real-time and run at bedside after set up by the technician, digitally displaying the power of electrographic frequencies included in the 1-30Hz band using a graded color map.  This data was reviewed and interpreted independently, and is reported in a separate section below.  History:  ROUTINE PERFORMED AT BEDSIDE COR: VENTED,SEDATED NO HV DUE TO PT STATUS PHOTIC PERFORMED 42 Y/O MALE P/W CARDIAC ARREST Hx: SEIZURE R/O SEIZURE  Medication Precedex Keppra (Levetiracetam) Fentanyl  Study Interpretation:  FINDINGS:  The background was continuous, spontaneously variable, and reactive. During wakefulness, the dominant rhythm consisted of fragments of 7 hz activity.  Background Slowing: Generalized slowing: theta/delta slowing Focal slowing: continuous polymorphic theta/delta slowing over the right temporal region  Sleep Background: Stage II sleep transients were not recorded.  Non-epileptiform activity: Breach effect over the right temporal region characterized by higher amplitudes, faster frequencies, and sharply contoured waveforms.  Epileptiform Activity:  None  Events: No clinical events were recorded. No seizures were recorded.  Activation Procedures:  -Hyperventilation was not performed.   -Photic stimulation was performed and did not elicit any abnormalities.    Artifacts: Intermittent myogenic and movement artifacts were noted.  ECG: The heart rate on single channel ECG was predominantly between 80-90 BPM.  EEG Classification / Summary:  Abnormal EEG in the awake/drowsy patient. -Continuous polymorphic slowing, focal, right temporal region -Mild to moderate background slowing, generalized  -Breach effect, right temporal region  Clinical Impression:  Focal dysfunction in the right temporal region. Mild to moderate nonspecific diffuse or multifocal cerebral dysfunction.  Skull defect over the right temporal region No seizures or epileptiform activities were recorded.  Preliminary Fellow Report, final report pending attending review  Blu Cunningham MD Epilepsy Fellow  Reading Room: [phone] On Call Service After Hours: [phone]    REPORT OF ROUTINE EEG WITH VIDEO  [facility]: [address], [address] [place] [address], Phone: [phone] [facility]: [phone] [place] [address], [place] [address], Phone: [phone] Office: [address] [place] [address], [place] [address], [place] [address], Phone: [phone]  Patient Name: JOCELYN JOSEPH   Age: 41 year : 1980 MRN #: -, Location: ICU 8 Referring Physician: -  EEG #: [id number] Study Date: 3/24/2022    		  Technical Information:					 On Instrument: - Placement and Labeling of Electrodes: The EEG was performed utilizing 20 channels referential EEG connections (coronal over temporal over parasagittal montage) using all standard 10-20 electrode placements with EKG.  Recording was at a sampling rate of 256 samples per second per channel.  Time synchronized digital video recording was done simultaneously with EEG recording.  A low light infrared camera was used for low light recording.  Ken and seizure detection algorithms were utilized. CSA Technical Component: Quantitative EEG analysis using a separate Compressed Spectral Array (CSA) software package was conducted in real-time and run at bedside after set up by the technician, digitally displaying the power of electrographic frequencies included in the 1-30Hz band using a graded color map.  This data was reviewed and interpreted independently, and is reported in a separate section below.  History:  ROUTINE PERFORMED AT BEDSIDE COR: VENTED,SEDATED NO HV DUE TO PT STATUS PHOTIC PERFORMED 42 Y/O MALE P/W CARDIAC ARREST Hx: SEIZURE R/O SEIZURE  Medication Precedex Keppra (Levetiracetam) Fentanyl  Study Interpretation:  FINDINGS:  The background was continuous, spontaneously variable, and reactive. During wakefulness, the dominant rhythm consisted of fragments of 7 hz activity.  Background Slowing: Generalized slowing: theta/delta slowing Focal slowing: continuous polymorphic theta/delta slowing over the right temporal region  Sleep Background: Stage II sleep transients were not recorded.  Non-epileptiform activity: Breach effect over the right temporal region characterized by higher amplitudes, faster frequencies, and sharply contoured waveforms.  Epileptiform Activity:  None  Events: No clinical events were recorded. No seizures were recorded.  Activation Procedures:  -Hyperventilation was not performed.   -Photic stimulation was performed and did not elicit any abnormalities.    Artifacts: Intermittent myogenic and movement artifacts were noted.  ECG: The heart rate on single channel ECG was predominantly between 80-90 BPM.  EEG Classification / Summary:  Abnormal EEG in the awake/drowsy patient. -Continuous polymorphic slowing, focal, right temporal region -Mild to moderate background slowing, generalized  -Breach effect, right temporal region  Clinical Impression:  Focal dysfunction in the right temporal region. Mild to moderate nonspecific diffuse or multifocal cerebral dysfunction.  Skull defect over the right temporal region No seizures or epileptiform activities were recorded.  Blu Cunningham MD Epilepsy Fellow  Swapnil Beckford MD PhD Director, Epilepsy Division, [facility] EEG Reading Room Ph#: [phone] Epilepsy Answering Service after 5PM and before 8:30AM: Ph#: [phone]

## 2022-03-24 NOTE — PROGRESS NOTE ADULT - SUBJECTIVE AND OBJECTIVE BOX
Neurology Progress Note    No acute events.     Neuro Exam: AOx1. Follows commands. No dysarthria. PERRL. Moving all four extremities against gravity with generalized weakness.     MRI brain- no acute process  EEG- focal dysfunction in right temporal region, no epileptiform discharges    A/P:  Toxic metabolic/hepatic/anoxic encephalopathy  s/p Cardiac arrest (3/16/22)  Epilepsy s/p right temporal lobectomy (2015)  Alcoholism  ?Cirrhosis  JHONATAN    - MRI brain and EEG unremarkable for acute process  - continue Keppra 1500mg BID. Level on admission was 6.7. Recheck level again now that he is on Keppra scheduled.  - patient is non-compliant with medication (last filled Keppra in 8/2021) and likely etiology of seizures and possibly cardiac arrest  - mental status not back to baseline prior to admission and possibly a result of anoxic brain injury. Monitor over the next few months.   - PT/OT and rehab

## 2022-03-24 NOTE — SWALLOW BEDSIDE ASSESSMENT ADULT - SLP PERTINENT HISTORY OF CURRENT PROBLEM
Patient admitted to the ICU for management of acute hypoxic respiratory failure, post cardiac arrest, encephalopathy, JHONATAN, transaminitis, hypoglycemia, lactic acidosis, and severe metabolic acidosis.

## 2022-03-25 VITALS
HEART RATE: 85 BPM | DIASTOLIC BLOOD PRESSURE: 79 MMHG | SYSTOLIC BLOOD PRESSURE: 115 MMHG | TEMPERATURE: 98 F | OXYGEN SATURATION: 97 % | RESPIRATION RATE: 17 BRPM

## 2022-03-25 LAB
ALBUMIN SERPL ELPH-MCNC: 2.5 G/DL — LOW (ref 3.3–5)
ALP SERPL-CCNC: 214 U/L — HIGH (ref 40–120)
ALT FLD-CCNC: 106 U/L — HIGH (ref 12–78)
ANION GAP SERPL CALC-SCNC: 9 MMOL/L — SIGNIFICANT CHANGE UP (ref 5–17)
AST SERPL-CCNC: 116 U/L — HIGH (ref 15–37)
BILIRUB SERPL-MCNC: 3.2 MG/DL — HIGH (ref 0.2–1.2)
BUN SERPL-MCNC: 5 MG/DL — LOW (ref 7–23)
CALCIUM SERPL-MCNC: 8.5 MG/DL — SIGNIFICANT CHANGE UP (ref 8.5–10.1)
CHLORIDE SERPL-SCNC: 113 MMOL/L — HIGH (ref 96–108)
CO2 SERPL-SCNC: 24 MMOL/L — SIGNIFICANT CHANGE UP (ref 22–31)
CREAT SERPL-MCNC: 0.49 MG/DL — LOW (ref 0.5–1.3)
EGFR: 132 ML/MIN/1.73M2 — SIGNIFICANT CHANGE UP
GLUCOSE BLDC GLUCOMTR-MCNC: 108 MG/DL — HIGH (ref 70–99)
GLUCOSE BLDC GLUCOMTR-MCNC: 119 MG/DL — HIGH (ref 70–99)
GLUCOSE BLDC GLUCOMTR-MCNC: 122 MG/DL — HIGH (ref 70–99)
GLUCOSE BLDC GLUCOMTR-MCNC: 156 MG/DL — HIGH (ref 70–99)
GLUCOSE SERPL-MCNC: 113 MG/DL — HIGH (ref 70–99)
POTASSIUM SERPL-MCNC: 3.7 MMOL/L — SIGNIFICANT CHANGE UP (ref 3.5–5.3)
POTASSIUM SERPL-SCNC: 3.7 MMOL/L — SIGNIFICANT CHANGE UP (ref 3.5–5.3)
PROT SERPL-MCNC: 5.5 GM/DL — LOW (ref 6–8.3)
SODIUM SERPL-SCNC: 146 MMOL/L — HIGH (ref 135–145)

## 2022-03-25 PROCEDURE — 99239 HOSP IP/OBS DSCHRG MGMT >30: CPT

## 2022-03-25 RX ORDER — AMLODIPINE BESYLATE 2.5 MG/1
1 TABLET ORAL
Qty: 0 | Refills: 0 | DISCHARGE
Start: 2022-03-25

## 2022-03-25 RX ORDER — LEVOTHYROXINE SODIUM 125 MCG
1 TABLET ORAL
Qty: 0 | Refills: 0 | DISCHARGE
Start: 2022-03-25

## 2022-03-25 RX ORDER — THIAMINE MONONITRATE (VIT B1) 100 MG
1 TABLET ORAL
Qty: 0 | Refills: 0 | DISCHARGE
Start: 2022-03-25

## 2022-03-25 RX ORDER — PANTOPRAZOLE SODIUM 20 MG/1
1 TABLET, DELAYED RELEASE ORAL
Qty: 0 | Refills: 0 | DISCHARGE
Start: 2022-03-25

## 2022-03-25 RX ORDER — LACTULOSE 10 G/15ML
15 SOLUTION ORAL
Qty: 0 | Refills: 0 | DISCHARGE
Start: 2022-03-25

## 2022-03-25 RX ORDER — FOLIC ACID 0.8 MG
1 TABLET ORAL
Qty: 0 | Refills: 0 | DISCHARGE
Start: 2022-03-25

## 2022-03-25 RX ORDER — LEVETIRACETAM 250 MG/1
15 TABLET, FILM COATED ORAL
Qty: 0 | Refills: 0 | DISCHARGE
Start: 2022-03-25

## 2022-03-25 RX ADMIN — Medication 1 TABLET(S): at 11:05

## 2022-03-25 RX ADMIN — CHLORHEXIDINE GLUCONATE 1 APPLICATION(S): 213 SOLUTION TOPICAL at 11:08

## 2022-03-25 RX ADMIN — LEVETIRACETAM 1500 MILLIGRAM(S): 250 TABLET, FILM COATED ORAL at 11:06

## 2022-03-25 RX ADMIN — Medication 1: at 11:29

## 2022-03-25 RX ADMIN — LACTULOSE 10 GRAM(S): 10 SOLUTION ORAL at 11:06

## 2022-03-25 RX ADMIN — Medication 1 MILLIGRAM(S): at 11:05

## 2022-03-25 RX ADMIN — Medication 100 MILLIGRAM(S): at 11:05

## 2022-03-25 RX ADMIN — ENOXAPARIN SODIUM 40 MILLIGRAM(S): 100 INJECTION SUBCUTANEOUS at 11:05

## 2022-03-25 RX ADMIN — Medication 1 PACKET(S): at 06:26

## 2022-03-25 RX ADMIN — AMLODIPINE BESYLATE 10 MILLIGRAM(S): 2.5 TABLET ORAL at 06:26

## 2022-03-25 RX ADMIN — Medication 500 MILLIGRAM(S): at 11:05

## 2022-03-25 RX ADMIN — PANTOPRAZOLE SODIUM 40 MILLIGRAM(S): 20 TABLET, DELAYED RELEASE ORAL at 06:26

## 2022-03-25 RX ADMIN — Medication 25 MICROGRAM(S): at 06:26

## 2022-03-25 NOTE — DISCHARGE NOTE PROVIDER - HOSPITAL COURSE
ICU transfer summary Note: This is 41 year old man with a PMHx of seizure (on keppra). Family called EMS after finding pt unresponsive in his bed. On arrival EMS reports pt was spontaneously breathing and had a pulse but was only responsive to pain. Patient's finger stick was noted to be 23. Per EMS, pt went into witnessed arrest shortly there after. ACLS immediately initiated, ROSC achieved after about 5-10 mins. No shocks given. On arrival to ED, pt in sinus tachy, glucose in 200s, hypotensive to 70s systolic. Levophed drip was started. Patient started biting on the ETT and fentanyl drip was ordered. Labs showed K: 3.4, Cl: 87, gap: 43, BUN: 33, Creat: 3.6, T Bili: 4.8, AST/ALT elevated, L: 18.9, TSH: 6.9, pH: 7.0, pCo2: 25, HCO3: 7. CT Head done reported no acute intracranial hemorrhage or mass effect and no evidence of diffuse global anoxic injury on head CT. Patient was admitted to the ICU for management of acute hypoxic respiratory failure, post cardiac arrest, encephalopathy, JHONATAN, transaminitis, hypoglycemia, lactic acidosis, and severe metabolic acidosis. Endocrinology was consulted. On 03/19, patient was extubated. Post extubation, patient was confused/ disoriented, tachycardic, hand tremors. There were concern for ETOH withdrawal. Phenobarbital tapering was started with improvement in CIWA score. EEG shows Increased risk of focal seizures with onset in the right temporal region or left frontotemporal region. Patient was restarted back to his home dose Keppra after renal function returned to normal. 03/16/22, patient was noted to have a fever. CXR shows new bibasilar airspace infiltrates most compatible with aspiration pneumonia. Blood culture pending and procal decreasing. Zoysn for a total of 7 days. Today, patient is hemodynamically stable. Patient had no acute event overnight. Patient has remained on room sat on 100% s/p extubation. Continue patient's phenobarbital taper, amlodipine for HTN, Keppra for sz, synthroid for hypothyroidism, and lactulose along with Rifaximin for encephalopathy. Ammonia normalized. AM level ordered. LFTs still elevated, continue to monitor. Follow up blood culture. Trend CBC and CMP. Replete electrolytes if needed. Transferred to medical floor on 3/23/22.   On the medical floor remained hemodynamically stable. phenobarbitone was discontinued and alcohol withdrawal improved.   Neurologist was consulted. MRI brain and EEG were done and found unremarkable. no further work up was recommended. Patient is discharged to [facility] in [place] per  PT recs and Family request.     Acute hypoxic respiratory failure due to cardiac arrest from seizure (non compliance with AED). also aspiration pneumonia (POA). s/p finished antibiotic course.    ? alcohol withdrawal. Alcoholism per wife per /care manager note. off phenobarbitone now. added thiamine.  Hx of seizure. cont keppra per neurologist. seizure precautions. keppra level pending. MRI brain and EEG unremarkable.   Acute metabolic/toxic/anoxic encephalopathy (POA). from seizure and cardiac arrest with acute respiratory failure. supportive care.   Hypoglycemia from likely seizure. improved.   JHONATAN. improved.   Dysphagia. cont pureed diet. aspiration precautions.   macrocytic anemia. reviewed vitamin B12 and folate. no active gross bleeding.   Hypophosphatemia. repleted   Debility. PT evaluated. ERICK recommended.   Underweight with BMI< 19. nutritional consult recs appreciated.   Alcoholic liver cirrhosis. cont low dose lactulose with goal BM 2-3 a day.  Hypothyroidism. cont synthroid and recommended to have repeat TFTs in 4-6 weeks.       Seen and examined by me today. Vitals stable.    I have educated patient's Family to use [employer] patient portal (as instructed on the discharge paperwork) to access medical records outside the hospital.   All questions welcomed and answered appropriately. Patient's wife verbalized understanding of post discharge instructions.   DC time spent by me excluding billable procedures 38 mins

## 2022-03-25 NOTE — PROGRESS NOTE ADULT - SUBJECTIVE AND OBJECTIVE BOX
Patient is a 41y old  Male who presents with a chief complaint of S/P Cardiac Arrest, Hypoglycemia, JHONATAN, metabolic acidosis, Transaminitis (19 Mar 2022 09:24)      Interval History: discharged [facility]  on 25 mcg levothyroxine     MEDICATIONS  (STANDING):  amLODIPine   Tablet 10 milliGRAM(s) Oral daily  ascorbic acid 500 milliGRAM(s) Oral daily  chlorhexidine 2% Cloths 1 Application(s) Topical daily  dextrose 40% Gel 15 Gram(s) Oral once  dextrose 5%. 1000 milliLiter(s) (50 mL/Hr) IV Continuous <Continuous>  dextrose 50% Injectable 25 Gram(s) IV Push once  dextrose 50% Injectable 12.5 Gram(s) IV Push once  dextrose 50% Injectable 25 Gram(s) IV Push once  enoxaparin Injectable 40 milliGRAM(s) SubCutaneous every 24 hours  folic acid 1 milliGRAM(s) Oral daily  glucagon  Injectable 1 milliGRAM(s) IntraMuscular once  insulin lispro (ADMELOG) corrective regimen sliding scale   SubCutaneous three times a day before meals  lactulose Syrup 10 Gram(s) Oral daily  levETIRAcetam  Solution 1500 milliGRAM(s) Oral two times a day  levothyroxine 25 MICROGram(s) Oral daily  multivitamin 1 Tablet(s) Oral daily  pantoprazole    Tablet 40 milliGRAM(s) Oral before breakfast  thiamine 100 milliGRAM(s) Oral daily    MEDICATIONS  (PRN):  acetaminophen     Tablet .. 650 milliGRAM(s) Oral every 6 hours PRN Temp greater or equal to 38C (100.4F), Mild Pain (1 - 3)      Allergies    Allergy Status Unknown    Intolerances        REVIEW OF SYSTEMS:  CONSTITUTIONAL: no changes  EYES: No eye pain, visual disturbances, or discharge  ENMT:  No difficulty hearing, No sinus or throat pain  NECK: No pain or stiffness  RESPIRATORY: No cough, wheezing, chills or hemoptysis; No shortness of breath  CARDIOVASCULAR: No chest pain, palpitations or leg swelling  GASTROINTESTINAL: No abdominal or epigastric pain. No nausea, vomiting, or hematemesis; No diarrhea or constipation. No melena or hematochezia.  GENITOURINARY: No dysuria, frequency, hematuria, or incontinence  NEUROLOGICAL: No headaches, memory loss, loss of strength, numbness, or tremors  SKIN: No itching, burning, rashes, or lesions   ENDOCRINE: No heat or cold intolerance; No hair loss  MUSCULOSKELETAL: No joint pain or swelling; No muscle, back, or extremity pain  PSYCHIATRIC: No depression, anxiety, mood swings, or difficulty sleeping  HEME/LYMPH: No easy bruising, or bleeding gums  ALLERY AND IMMUNOLOGIC: No hives or eczema    Vital Signs Last 24 Hrs  T(C): 36.8 (25 Mar 2022 17:10), Max: 37.2 (25 Mar 2022 10:49)  T(F): 98.2 (25 Mar 2022 17:10), Max: 99 (25 Mar 2022 10:49)  HR: 85 (25 Mar 2022 17:10) (84 - 94)  BP: 115/79 (25 Mar 2022 17:10) (110/75 - 129/85)  BP(mean): --  RR: 17 (25 Mar 2022 17:10) (16 - 19)  SpO2: 97% (25 Mar 2022 17:10) (97% - 98%)    PHYSICAL EXAM:  GENERAL:   HEAD: Atraumatic, Normocephalic  EYES: PERRLA, conjunctiva and sclera clear  ENMT: No  exudates,; Moist mucous membranes,, No lesions  NECK: Supple, No JVD, Normal thyroid  NERVOUS SYSTEM:  Alert & Oriented,   CHEST/LUNG: Clear to auscultation bilaterally; No rales, rhonchi, wheezing, or rubs  HEART: Regular rate and rhythm; No murmurs, rubs, or gallops  ABDOMEN: Soft, Nontender, Nondistended; Bowel sounds present  EXTREMITIES:  2+ Peripheral Pulses, no edema  SKIN: No rashes or lesions    LABS:        CAPILLARY BLOOD GLUCOSE      POCT Blood Glucose.: 122 mg/dL (25 Mar 2022 15:54)  POCT Blood Glucose.: 156 mg/dL (25 Mar 2022 11:05)  POCT Blood Glucose.: 108 mg/dL (25 Mar 2022 07:37)  POCT Blood Glucose.: 119 mg/dL (25 Mar 2022 00:26)    Lipid panel:           Thyroid:  Diabetes Tests:  Parathyroid Panel:  Adrenals:  RADIOLOGY & ADDITIONAL TESTS:    Imaging Personally Reviewed:  [ ] YES  [ ] NO    Consultant(s) Notes Reviewed:  [ ] YES  [ ] NO    Care Discussed with Consultants/Other Providers [ ] YES  [ ] NO

## 2022-03-25 NOTE — DISCHARGE NOTE PROVIDER - NSDCFUADDINST_GEN_ALL_CORE_FT
1) It is important to see your primary physician as well as other necessary consultants within next 7-10 days to perform a comprehensive medical review.  Call their offices for an appointment as soon as you leave the hospital.  If you do not have a primary physician or unable to reach your PCP, contact the [facility] Physician Referral Service at [phone].  Your medical issues appear to be stable at this time, but if your symptoms recur or worsen, contact your physicians and/or return to the hospital if necessary.  If you encounter any issues or questions with your medication, call your physicians before stopping the medication.    2) Please access [facility] Patient portal (as instructed on the discharge paperwork) to access your medical records at any time after discharge.     3) discussed with wife on phone about importance of rehab now and also about seizure precautions and medication compliance.

## 2022-03-25 NOTE — PROGRESS NOTE ADULT - SUBJECTIVE AND OBJECTIVE BOX
Neurology Progress Note    No acute events.     Neuro Exam: Sleeping, does not want to be bothered. Moving extremities in bed.      MRI brain- no acute process  EEG- focal dysfunction in right temporal region, no epileptiform discharges    A/P:  Toxic metabolic/hepatic/anoxic encephalopathy  s/p Cardiac arrest (3/16/22)  Epilepsy s/p right temporal lobectomy (2015)  Alcoholism  ?Cirrhosis  JHONATAN    - MRI brain and EEG unremarkable for acute process  - continue Keppra 1500mg BID. Level on admission was 6.7. Repeat Keppra level pending  - patient is non-compliant with medication (last filled Keppra in 8/2021) and likely etiology of seizures and possibly cardiac arrest  - mental status not back to baseline prior to admission and possibly a result of anoxic brain injury. Monitor over the next few months.   - PT/OT and rehab

## 2022-03-25 NOTE — PROGRESS NOTE ADULT - PROVIDER SPECIALTY LIST ADULT
Critical Care
Critical Care
Electrophysiology
Neurology
Neurology
Critical Care
Endocrinology
Hospitalist
Hospitalist
Endocrinology
Critical Care
Endocrinology

## 2022-03-25 NOTE — DISCHARGE NOTE NURSING/CASE MANAGEMENT/SOCIAL WORK - PATIENT PORTAL LINK FT
You can access the FollowMyHealth Patient Portal offered by [facility] by registering at the following website: [email or web address]. By joining [facility]’s FollowMyHealth portal, you will also be able to view your health information using other applications (apps) compatible with our system.

## 2022-03-25 NOTE — DISCHARGE NOTE NURSING/CASE MANAGEMENT/SOCIAL WORK - NSTRANSFERBELONGINGSRESP_GEN_A_NUR
Problem: Patient Care Overview  Goal: Plan of Care Review  Outcome: Ongoing (interventions implemented as appropriate)   06/05/18 1138   Coping/Psychosocial   Plan of Care Reviewed With patient   OTHER   Outcome Summary pt. tolerated tx. very well and showed great mobility and safety. pt. ambulated 150' with SBA and no LOB. pt. had L knee ROM 8-104 degrees   Plan of Care Review   Progress improving          yes

## 2022-03-25 NOTE — CHART NOTE - NSCHARTNOTEFT_GEN_A_CORE
Per chart pt with [facility] seizures, admitted s/p cardiac arrest with acute respiratory failure, encephalopathy, metabolic acidosis, intubated 03/16. Extubated 03/19. Course complicated by possible alcohol withdrawal. s/p swallow evaluation 03/24 with recommendations for soft, bite-sized with mildly thick liquids.     Factors impacting intake: [ ] none [ ] nausea  [ ] vomiting [ ] diarrhea [ ] constipation  [ ]chewing problems [ ] swallowing issues  [x] other: total assistance; difficulty self-feeding    Diet Prescription: Diet, Pureed:   Consistent Carbohydrate {No Snacks}  Mildly Thick Liquids (MILDTHICKLIQS) (03-20-22 @ 11:09)    Intake: 0-75% as per flow sheets    Current Weight: (03/25) 54.6kg (03/16) 54.6kg  % Weight Change: weight stable    Edema: 2 + generalized as per flow sheets    Physical Appearance:     Pertinent Medications: MEDICATIONS  (STANDING):  amLODIPine   Tablet 10 milliGRAM(s) Oral daily  ascorbic acid 500 milliGRAM(s) Oral daily  chlorhexidine 2% Cloths 1 Application(s) Topical daily  dextrose 40% Gel 15 Gram(s) Oral once  dextrose 5%. 1000 milliLiter(s) (50 mL/Hr) IV Continuous <Continuous>  dextrose 50% Injectable 25 Gram(s) IV Push once  dextrose 50% Injectable 12.5 Gram(s) IV Push once  dextrose 50% Injectable 25 Gram(s) IV Push once  enoxaparin Injectable 40 milliGRAM(s) SubCutaneous every 24 hours  folic acid 1 milliGRAM(s) Oral daily  glucagon  Injectable 1 milliGRAM(s) IntraMuscular once  insulin lispro (ADMELOG) corrective regimen sliding scale   SubCutaneous three times a day before meals  lactulose Syrup 10 Gram(s) Oral daily  levETIRAcetam  Solution 1500 milliGRAM(s) Oral two times a day  levothyroxine 25 MICROGram(s) Oral daily  multivitamin 1 Tablet(s) Oral daily  pantoprazole    Tablet 40 milliGRAM(s) Oral before breakfast  thiamine 100 milliGRAM(s) Oral daily    MEDICATIONS  (PRN):  acetaminophen     Tablet .. 650 milliGRAM(s) Oral every 6 hours PRN Temp greater or equal to 38C (100.4F), Mild Pain (1 - 3)    Pertinent Labs: 03-25 Na146 mmol/L<H> Glu 113 mg/dL<H> K+ 3.7 mmol/L Cr  0.49 mg/dL<L> BUN 5 mg/dL<L> 03-24 Phos 3.0 mg/dL 03-25 Alb 2.5 g/dL<L>    03-18-22 @ 09:07  A1C 5.0    Skin: no pressure injuries as per flow sheets    Estimated Needs:   [x] no change since previous assessment: 03/18/22  [ ] recalculated:     Previous Nutrition Diagnosis:   [x]  Inadequate Oral Intake.     Etiology Decreased ability to consume sufficient energy.     Signs/Symptoms NPO x 2 days.     Goal/Expected Outcome Pt will Meet calorie and protein needs > 50% via tolerated route.    Nutrition Diagnosis is [ ] ongoing  [ ] resolved [ ] not applicable     New Nutrition Diagnosis: [ ] not applicable      Interventions:   Recommend  [ ] Change Diet To:  [ ] Nutrition Supplement  [ ] Nutrition Support  [ ] Other:     Monitoring and Evaluation:   [x] PO intake [ x ] Tolerance to diet prescription [ x ] weights [ x ] labs[ x ] follow up per protocol  [ ] other: Per chart pt with [facility] seizures, admitted s/p cardiac arrest with acute respiratory failure, encephalopathy, metabolic acidosis, intubated 03/16. Extubated 03/19. Course complicated by possible alcohol withdrawal. s/p swallow evaluation 03/24 with recommendations for soft, bite-sized with mildly thick liquids- MD note (03/24) states to continue puree at this time- RN reports pt tolerating. Alert and confused at time of visit.    Factors impacting intake: [ ] none [ ] nausea  [ ] vomiting [ ] diarrhea [ ] constipation  [ ]chewing problems [ ] swallowing issues  [x] other: total assistance; difficulty self-feeding    Diet Prescription: Diet, Pureed:   Consistent Carbohydrate {No Snacks}  Mildly Thick Liquids (MILDTHICKLIQS) (03-20-22 @ 11:09)    Intake: 0-75% as per flow sheets    Current Weight: (03/25) 54.6kg (03/16) 54.6kg  % Weight Change: weight stable    Edema: 2 + generalized as per flow sheets    Physical Appearance: Nutrition focused physical exam conducted:  Subcutaneous fat loss: [mild ] Orbital fat pads region, [unable ]Buccal fat region, [WDL ]Triceps region,  [ unable]Ribs region.  Muscle wasting: [ mild]Temples region, [mild ]Clavicle region, [WDL ]Shoulder region, [unable ]Scapula region, [unable ]Interosseous region,  [mild ]thigh region, [WDL]Calf region    Pertinent Medications: MEDICATIONS  (STANDING):  amLODIPine   Tablet 10 milliGRAM(s) Oral daily  ascorbic acid 500 milliGRAM(s) Oral daily  chlorhexidine 2% Cloths 1 Application(s) Topical daily  dextrose 40% Gel 15 Gram(s) Oral once  dextrose 5%. 1000 milliLiter(s) (50 mL/Hr) IV Continuous <Continuous>  dextrose 50% Injectable 25 Gram(s) IV Push once  dextrose 50% Injectable 12.5 Gram(s) IV Push once  dextrose 50% Injectable 25 Gram(s) IV Push once  enoxaparin Injectable 40 milliGRAM(s) SubCutaneous every 24 hours  folic acid 1 milliGRAM(s) Oral daily  glucagon  Injectable 1 milliGRAM(s) IntraMuscular once  insulin lispro (ADMELOG) corrective regimen sliding scale   SubCutaneous three times a day before meals  lactulose Syrup 10 Gram(s) Oral daily  levETIRAcetam  Solution 1500 milliGRAM(s) Oral two times a day  levothyroxine 25 MICROGram(s) Oral daily  multivitamin 1 Tablet(s) Oral daily  pantoprazole    Tablet 40 milliGRAM(s) Oral before breakfast  thiamine 100 milliGRAM(s) Oral daily    MEDICATIONS  (PRN):  acetaminophen     Tablet .. 650 milliGRAM(s) Oral every 6 hours PRN Temp greater or equal to 38C (100.4F), Mild Pain (1 - 3)    Pertinent Labs: 03-25 Na146 mmol/L<H> Glu 113 mg/dL<H> K+ 3.7 mmol/L Cr  0.49 mg/dL<L> BUN 5 mg/dL<L> 03-24 Phos 3.0 mg/dL 03-25 Alb 2.5 g/dL<L>    03-18-22 @ 09:07  A1C 5.0    Skin: no pressure injuries as per flow sheets    Estimated Needs:   [x] no change since previous assessment: 03/18/22  [ ] recalculated:     Previous Nutrition Diagnosis:   [x]  Inadequate Oral Intake.     Etiology Decreased ability to consume sufficient energy ADDENDUM (03/25): due to encephalopathy.     Signs/Symptoms NPO x 2 days.     NEW signs/symptom (03/25/22): variable intake 0-75% as per flow sheets; <75% meals consumed during LOS    Goal/Expected Outcome Pt will Meet calorie and protein needs > 50% via tolerated route. (not consistently met)    Nutrition Diagnosis is [x] ongoing  [ ] resolved [ ] not applicable     New Nutrition Diagnosis: [x] not applicable      Interventions:   Recommend  [x] Continue current diet as ordered; when feasible advance to soft, bite-sized with mildly thickened liquids (as recommended by SLP 03/24), consistent carbohydrate no snack  [ ] Change Diet To:  [x] Nutrition Supplement: Add NSA Magic Cup x 2/day (provides 520 kcal, 18 g protein)   [ ] Nutrition Support  [ ] Other:     Monitoring and Evaluation:   [x] PO intake [ x ] Tolerance to diet prescription [ x ] weights [ x ] labs[ x ] follow up per protocol  [ ] other:

## 2022-03-25 NOTE — DISCHARGE NOTE PROVIDER - PROVIDER TOKENS
FREE:[LAST:[PCP post rehab discharge],PHONE:[(   )    -],FAX:[(   )    -]],PROVIDER:[TOKEN:[[id number]],FOLLOWUP:[1 month]]

## 2022-03-25 NOTE — DISCHARGE NOTE NURSING/CASE MANAGEMENT/SOCIAL WORK - NSDCPEFALRISK_GEN_ALL_CORE
For information on Fall & Injury Prevention, visit: [email or web address] OR  [email or web address] OR  https://www.cdc.gov/steadi/patient.html

## 2022-03-25 NOTE — DISCHARGE NOTE PROVIDER - CARE PROVIDER_API CALL
PCP post rehab discharge,   Phone: (   )    -  Fax: (   )    -  Follow Up Time:     Salome Ortiz  NEUROLOGY  [address] [place] [address]  Phone: [phone]  Fax: [phone]  Follow Up Time: 1 month

## 2022-03-25 NOTE — DISCHARGE NOTE PROVIDER - NSDCMRMEDTOKEN_GEN_ALL_CORE_FT
amLODIPine 10 mg oral tablet: 1 tab(s) orally once a day  folic acid 1 mg oral tablet: 1 tab(s) orally once a day  lactulose 10 g/15 mL oral syrup: 15 milliliter(s) orally once a day  levETIRAcetam 100 mg/mL oral solution: 15 milliliter(s) orally 2 times a day  levothyroxine 25 mcg (0.025 mg) oral tablet: 1 tab(s) orally once a day  Multiple Vitamins oral tablet: 1 tab(s) orally once a day  pantoprazole 40 mg oral delayed release tablet: 1 tab(s) orally once a day (before a meal)  thiamine 100 mg oral tablet: 1 tab(s) orally once a day

## 2022-03-25 NOTE — DISCHARGE NOTE PROVIDER - DETAILS OF MALNUTRITION DIAGNOSIS/DIAGNOSES
This patient has been assessed with a concern for Malnutrition and was treated during this hospitalization for the following Nutrition diagnosis/diagnoses:     -  03/18/2022: Underweight (BMI < 19)

## 2022-03-25 NOTE — PROGRESS NOTE ADULT - PROBLEM SELECTOR PROBLEM 1
Hypothyroidism in adult

## 2022-03-26 LAB
CULTURE RESULTS: SIGNIFICANT CHANGE UP
CULTURE RESULTS: SIGNIFICANT CHANGE UP
SPECIMEN SOURCE: SIGNIFICANT CHANGE UP
SPECIMEN SOURCE: SIGNIFICANT CHANGE UP

## 2022-03-29 LAB — LEVETIRACETAM SERPL-MCNC: 53.2 UG/ML — HIGH (ref 10–40)

## 2022-04-05 DIAGNOSIS — K70.30 ALCOHOLIC CIRRHOSIS OF LIVER WITHOUT ASCITES: ICD-10-CM

## 2022-04-05 DIAGNOSIS — F10.239 ALCOHOL DEPENDENCE WITH WITHDRAWAL, UNSPECIFIED: ICD-10-CM

## 2022-04-05 DIAGNOSIS — E11.649 TYPE 2 DIABETES MELLITUS WITH HYPOGLYCEMIA WITHOUT COMA: ICD-10-CM

## 2022-04-05 DIAGNOSIS — Z72.0 TOBACCO USE: ICD-10-CM

## 2022-04-05 DIAGNOSIS — R53.81 OTHER MALAISE: ICD-10-CM

## 2022-04-05 DIAGNOSIS — D69.6 THROMBOCYTOPENIA, UNSPECIFIED: ICD-10-CM

## 2022-04-05 DIAGNOSIS — R63.6 UNDERWEIGHT: ICD-10-CM

## 2022-04-05 DIAGNOSIS — Z86.74 PERSONAL HISTORY OF SUDDEN CARDIAC ARREST: ICD-10-CM

## 2022-04-05 DIAGNOSIS — Z91.14 PATIENT'S OTHER NONCOMPLIANCE WITH MEDICATION REGIMEN: ICD-10-CM

## 2022-04-05 DIAGNOSIS — J69.0 PNEUMONITIS DUE TO INHALATION OF FOOD AND VOMIT: ICD-10-CM

## 2022-04-05 DIAGNOSIS — R13.10 DYSPHAGIA, UNSPECIFIED: ICD-10-CM

## 2022-04-05 DIAGNOSIS — J96.01 ACUTE RESPIRATORY FAILURE WITH HYPOXIA: ICD-10-CM

## 2022-04-05 DIAGNOSIS — R74.01 ELEVATION OF LEVELS OF LIVER TRANSAMINASE LEVELS: ICD-10-CM

## 2022-04-05 DIAGNOSIS — N17.0 ACUTE KIDNEY FAILURE WITH TUBULAR NECROSIS: ICD-10-CM

## 2022-04-05 DIAGNOSIS — J96.02 ACUTE RESPIRATORY FAILURE WITH HYPERCAPNIA: ICD-10-CM

## 2022-04-05 DIAGNOSIS — I46.9 CARDIAC ARREST, CAUSE UNSPECIFIED: ICD-10-CM

## 2022-04-05 DIAGNOSIS — D53.9 NUTRITIONAL ANEMIA, UNSPECIFIED: ICD-10-CM

## 2022-04-05 DIAGNOSIS — K72.90 HEPATIC FAILURE, UNSPECIFIED WITHOUT COMA: ICD-10-CM

## 2022-04-05 DIAGNOSIS — E87.2 ACIDOSIS: ICD-10-CM

## 2022-04-05 DIAGNOSIS — E83.42 HYPOMAGNESEMIA: ICD-10-CM

## 2022-04-05 DIAGNOSIS — E03.9 HYPOTHYROIDISM, UNSPECIFIED: ICD-10-CM

## 2022-04-05 DIAGNOSIS — G92.8 OTHER TOXIC ENCEPHALOPATHY: ICD-10-CM

## 2022-04-05 DIAGNOSIS — E83.39 OTHER DISORDERS OF PHOSPHORUS METABOLISM: ICD-10-CM

## 2022-04-05 DIAGNOSIS — E87.6 HYPOKALEMIA: ICD-10-CM

## 2022-04-05 DIAGNOSIS — Z87.19 PERSONAL HISTORY OF OTHER DISEASES OF THE DIGESTIVE SYSTEM: ICD-10-CM

## 2022-04-05 DIAGNOSIS — G40.909 EPILEPSY, UNSPECIFIED, NOT INTRACTABLE, WITHOUT STATUS EPILEPTICUS: ICD-10-CM

## 2022-04-05 DIAGNOSIS — G93.1 ANOXIC BRAIN DAMAGE, NOT ELSEWHERE CLASSIFIED: ICD-10-CM

## 2022-09-26 NOTE — PROGRESS NOTE ADULT - PROBLEM SELECTOR PLAN 1
Continue with the current  regimen while inpatient   Will need more levothyroxine as TSH need to be corrected to 2.0.  Recovery of TSH lags behind the recovery of free thyroxine
Continue with low-dose levothyroxine.  Will require more in the future.  The TSH should be corrected to 2.0.  There is a The recovery of TSH may lag behind time wise compared to  thyroid hormones like T4 and T3 in recovery of TSH as compared to T4 and T3 recovery
Continue with the current  regimen while inpatient   can be discharged on current dose/ regimen   Check thyroid function tests in  few weeks as out-patient   correct TSH to 2.0 or so   The recovery of TSH may lag behind time wise compared to  thyroid hormones like T4 and T3   will need more levothyroxine dose
Continue with the same dose of levothyroxine.  The TSH should be corrected to 2.0.  He will need more levothyroxine
continue same dose levothyroxine   will need more   Check thyroid function tests in a few days , TSH should be corrected to 2.0 or so
Continue with the current dose of levothyroxine.  Will need more dose to keep the TSH ultimately around 2.0 or slightly below.  Blood glucose is under good control.  Patient does not have any history of diabetes
Continue with 25 MCG of levothyroxine.  Will need more.  The recovery of TSH lags behind the recovery of T4 and T3 and the TSH should be corrected to 2.0 by adjusting the
Yes - the patient is able to be screened
